# Patient Record
Sex: FEMALE | Race: WHITE | NOT HISPANIC OR LATINO | Employment: FULL TIME | URBAN - METROPOLITAN AREA
[De-identification: names, ages, dates, MRNs, and addresses within clinical notes are randomized per-mention and may not be internally consistent; named-entity substitution may affect disease eponyms.]

---

## 2018-10-02 ENCOUNTER — TRANSFERRED RECORDS (OUTPATIENT)
Dept: HEALTH INFORMATION MANAGEMENT | Facility: CLINIC | Age: 53
End: 2018-10-02

## 2018-10-02 LAB
CREAT SERPL-MCNC: 0.7 MG/DL (ref 0.5–1.2)
GFR SERPL CREATININE-BSD FRML MDRD: 100 ML/MIN/1.73M2
GLUCOSE SERPL-MCNC: 86 MG/DL (ref 65–99)
POTASSIUM SERPL-SCNC: 4.6 MMOL/L (ref 3.5–5)

## 2018-10-03 ENCOUNTER — OFFICE VISIT (OUTPATIENT)
Dept: SURGERY | Facility: CLINIC | Age: 53
End: 2018-10-03
Payer: COMMERCIAL

## 2018-10-03 ENCOUNTER — TELEPHONE (OUTPATIENT)
Dept: OTHER | Facility: CLINIC | Age: 53
End: 2018-10-03

## 2018-10-03 VITALS
RESPIRATION RATE: 12 BRPM | WEIGHT: 293 LBS | SYSTOLIC BLOOD PRESSURE: 124 MMHG | OXYGEN SATURATION: 96 % | HEART RATE: 76 BPM | HEIGHT: 69 IN | BODY MASS INDEX: 43.4 KG/M2 | DIASTOLIC BLOOD PRESSURE: 88 MMHG

## 2018-10-03 VITALS — WEIGHT: 293 LBS | HEIGHT: 69 IN | BODY MASS INDEX: 43.4 KG/M2

## 2018-10-03 DIAGNOSIS — Z13.0 SCREENING FOR ENDOCRINE, NUTRITIONAL, METABOLIC AND IMMUNITY DISORDER: ICD-10-CM

## 2018-10-03 DIAGNOSIS — Z86.39 HISTORY OF HIGH CHOLESTEROL: ICD-10-CM

## 2018-10-03 DIAGNOSIS — K21.9 GERD WITHOUT ESOPHAGITIS: ICD-10-CM

## 2018-10-03 DIAGNOSIS — Z15.89 MTHFR GENE MUTATION: ICD-10-CM

## 2018-10-03 DIAGNOSIS — G43.909 MIGRAINE WITHOUT STATUS MIGRAINOSUS, NOT INTRACTABLE, UNSPECIFIED MIGRAINE TYPE: ICD-10-CM

## 2018-10-03 DIAGNOSIS — E66.01 MORBID OBESITY WITH BMI OF 45.0-49.9, ADULT (H): ICD-10-CM

## 2018-10-03 DIAGNOSIS — Z13.21 SCREENING FOR ENDOCRINE, NUTRITIONAL, METABOLIC AND IMMUNITY DISORDER: ICD-10-CM

## 2018-10-03 DIAGNOSIS — Z13.29 SCREENING FOR ENDOCRINE, NUTRITIONAL, METABOLIC AND IMMUNITY DISORDER: ICD-10-CM

## 2018-10-03 DIAGNOSIS — E66.01 MORBID OBESITY WITH BMI OF 45.0-49.9, ADULT (H): Primary | ICD-10-CM

## 2018-10-03 DIAGNOSIS — Z13.228 SCREENING FOR ENDOCRINE, NUTRITIONAL, METABOLIC AND IMMUNITY DISORDER: ICD-10-CM

## 2018-10-03 PROCEDURE — 99205 OFFICE O/P NEW HI 60 MIN: CPT | Performed by: PHYSICIAN ASSISTANT

## 2018-10-03 PROCEDURE — 97802 MEDICAL NUTRITION INDIV IN: CPT | Performed by: DIETITIAN, REGISTERED

## 2018-10-03 RX ORDER — PRAVASTATIN SODIUM 20 MG
20 TABLET ORAL EVERY EVENING
COMMUNITY

## 2018-10-03 NOTE — TELEPHONE ENCOUNTER
Pt referred to [facility] by Eleonora London PA-C for   Pre/Post op plan for bariatric surgery and MTHFR gene mutation.    Pt needs to be scheduled for consult with vascular medicine.  Will route to scheduling to coordinate an appointment at next available.    BENJY QueenN, RN

## 2018-10-03 NOTE — PROGRESS NOTES
"New Bariatric Nutrition Consultation Note    Reason For Visit: Nutrition Assessment    Laura Nava is a 52 year old presenting today for new bariatric nutrition consult.  Pt is interested in laparoscopic sleeve gastrectomy.  Patient is accompanied by self.    Support System Reviewed With Patient 10/2/2018   Who do you have in your support network that can be available to help you for the first 2 weeks after surgery? spouse, kids   Who can you count on for support throughout your weight loss surgery journey? sister       ANTHROPOMETRICS:  Estimated body mass index is 48.04 kg/(m^2) as calculated from the following:    Height as of this encounter: 1.753 m (5' 9\").    Weight as of this encounter: 147.6 kg (325 lb 4.8 oz).    Required weight loss goal pre-op: 5 lbs from initial consult weight (goal weight 320.3 lbs or less before surgery)       10/2/2018   I have tried the following methods to lose weight Watching portions or calories, Exercise, Weight Watchers, Pre packaged meals ex: Nutrisystem, Slimfast, OTC Medications, Prescription Medications       Weight Loss Questions Reviewed With Patient 10/2/2018   How long have you been overweight? Since late 20's to early 40's       SUPPLEMENT INFORMATION:  Vitamin D - 2000 international units    NUTRITION HISTORY:  Recall Diet Questions Reviewed With Patient 10/2/2018   Describe what you typically consume for breakfast (typical or most recent): peanut butter toast, juice, coffee   Describe what you typically consume for lunch (typical or most recent): sandwich, fruit, yogurt   Describe what you typically consume for supper (typical or most recent): chicken, potatoes, veg, fruit, bread, milk   Describe what you typically consume as snacks (typical or most recent): M&Ms, granola bar   How many ounces of water, or other low calorie drinks, do you drink daily (8 oz=1 glass)? 48 oz   How many ounces of caffeine (coffee, tea, pop) do you drink daily (8 oz=1 glass)? 16 oz "   How many ounces of carbonated (pop, beer, sparkling water) drinks do you drinky daily (8 oz=1 glass)? 8 oz   How many ounces of juice, pop, sweet tea, sports drinks, protein drinks, other sweetened drinks, do you drink daily (8 oz=1 glass)? 8 oz   How many ounces of milk do you drink daily (8 oz=1 glass) 8 oz   Please indicate the type of milk: 1%   How often do you drink alcohol? Monthly or less   If you do drink alcohol, how many drinks might you have in a day? (one drink = 5 oz. wine, 1 can/bottle of beer, 1 shot liquor) 1 or 2       Eating Habits 10/2/2018   Do you have any dietary restrictions? No   Do you currently binge eat (eat a large amount of food in a short time)? No   Are you an emotional eater? No   Do you get up to eat after falling asleep? No   What foods do you crave? sweets       ADDITIONAL INFORMATION:  Pt has been thinking about surgery for years. Has one sister who had bariatric surgery 10 years ago with poor long-term success. Has another sister going through pre-op bariatric process currently. Pt is a  and had many appropriate questions today.     Dining Out History Reviewed With Patient 10/2/2018   How often do you dine out? Around once a week.   Where do you dine out? (select all that apply) fast food chains   What types of food do you order when you dine out? hamburger, fries, diet pop       Physical Activity Reviewed With Patient 10/2/2018   How often do you exercise? 3 to 4 times per week   What is the duration of your exercise (in minutes)? 15 Minutes   What types of exercise do you do? walking   What keeps you from being more active?  Lack of Time       NUTRITION DIAGNOSIS:  Obesity r/t long history of self-monitoring deficit and excessive energy intake aeb BMI >30 kg/m2.    INTERVENTION:  Intervention Provided/Education Provided on post-op diet guidelines, vitamins/minerals essential post-operatively, GI anatomy of bariatric surgeries, ways to help prepare for post-op  diet guidelines pre-operatively, portion/calorie-control, mindful eating.  Provided pt with list of goals RD contact information.      Questions Reviewed With Patient 10/2/2018   How ready are you to make changes regarding your weight? Number 1 = Not ready at all to make changes up to 10 = very ready. 10   How confident are you that you can change? 1 = Not confident that you will be successful making changes up to 10 = very confident. 10       Patient Understanding: good  Expected Compliance: good    GOALS:  Begin taking multivitamin and calcium per guidelines  Exercise for 30 minutes, 4x/week  Reduce caffeine and carbonation by 50%    Follow-Up:   Recommend 2-3 follow up visits to assist with lifestyle changes or per insurance.      Time spent with patient: 60 minutes.    Xiomy Alva RD, LD  Clinical Dietitian

## 2018-10-03 NOTE — MR AVS SNAPSHOT
MRN:[id number]                      After Visit Summary   10/3/2018    Laura Nava    MRN: [id number]           Visit Information        Provider Department      10/3/2018 3:00 PM 3, Phoebe Ayala RD [facility]      Your next 10 appointments already scheduled     Nov 02, 2018  9:30 AM CDT   Return Bariatric Nutrition Visit with Phoebe Ayala 3, RD   [facility] ([facility])    [address] [place] [id number] [phone]              MyChart Information     [employer] gives you secure access to your electronic health record. If you see a primary care provider, you can also send messages to your care team and make appointments. If you have questions, please call your primary care clinic.  If you do not have a primary care provider, please call [phone] and they will assist you.        Care EveryWhere ID     This is your Care EveryWhere ID. This could be used by other organizations to access your [facility] medical records  [id number]        Equal Access to Services     [place] BACK : Hadii arlin Diaz, waaxda luqadaha, qaybta kaalmachester briones, faith monroe. So [facility] [phone].    ATENCIÓN: Si habla español, tiene a diaz disposición servicios gratuitos de asistencia lingüística. Llame al [phone].    We comply with applicable federal civil rights laws and [place] laws. We do not discriminate on the basis of race, color, national origin, age, disability, sex, sexual orientation, or gender identity.

## 2018-10-03 NOTE — LETTER
Laura Nava  October 3, 2018        Bariatric Task List      Lose 5 lbs prior to surgery.  Goal Weight: 320.3 lbs  Have preoperative laboratory tests drawn. - order A1C and CMP. Pt to get remaining labs faxed to clinic  Psychological Evaluation with MMPI and clearance for weight loss surgery.- [place]  Achieve clearance from dietitian to see surgeon.  Pre/Post op anticoagulation plan

## 2018-10-03 NOTE — PROGRESS NOTES
"    New Bariatric Surgery Consultation Note    RE: Laura Nava  MR#: [id number]  : 1965      Referring provider: Vicki Ruiz PA-C at [facility]    Chief Complaint/Reason for visit: evaluation for possible weight loss surgery    Dear Vicki Ruiz MD (General),    I had the pleasure of seeing your patient, Laura Nava, to evaluate her obesity and consider her for possible weight loss surgery. As you know, Laura Nava is 52 year old.  She has a height of 5' 9\", a weight of 325 lbs 4.8 oz, and calculated Body mass index is 48.04 kg/(m^2).        HISTORY OF PRESENT ILLNESS:  Weight Loss History Reviewed with Patient 10/2/2018   How long have you been overweight? Since late 20's to early 40's   What is the most that you have ever weighed? 320   What is the most weight you have lost? 30   I have tried the following methods to lose weight Watching portions or calories, Exercise, Weight Watchers, Pre packaged meals ex: Nutrisystem, Slimfast, OTC Medications, Prescription Medications   I have tried the following weight loss medications? (Check all that apply) Phentermine/Adipex-p/Suprenza   Have you ever had weight loss surgery? No     Lost 30 pds on weight watchers. No history fen-phen use.  Used phentermine about 6-7 years ago.  Nothing since    CO-MORBIDITIES OF OBESITY INCLUDE:     10/2/2018   I have the following co-morbidities associated with obesity: High Cholesterol, Weight Bearing Joint Pain     On meds for cholesterol.     PAST MEDICAL HISTORY:  Past Medical History:   Diagnosis Date     Anxiety     zoloft     Migraines Approx     Under Dr. Reyes neurologist care     Takes nortryptyline daily for migraine management.  Zomig as needed.  Much improved over the past year since starting medication    PAST SURGICAL HISTORY:  Past Surgical History:   Procedure Laterality Date     ENT SURGERY - Ear surgery       With 3rd and last pregnancy patient developed blood clots in " her placenta that lead to the diagnosis of MTHFR.  She took lovenox for the remainder of her pregnancy.  Mother and father both tested positive for this as well.     No family history of blood clots or anesthesia concerns      FAMILY HISTORY:   Family History   Problem Relation Age of Onset     Diabetes Paternal Grandmother      Coronary Artery Disease Father      Hyperlipidemia Father      Anxiety Disorder Father      Asthma Father      Obesity Father      Breast Cancer Mother      Other Cancer Brother      melanoma, brain tumor     Anxiety Disorder Brother      Anxiety Disorder Sister      Obesity Sister      Anxiety Disorder Sister      Thyroid Disease Sister      Obesity Sister      Uterine Cancer Maternal Grandmother      Sister had gastric bypass about 10 years ago.    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 , professor, manager   Which best describes your marital status:    Do you have children? Yes   Who do you have in your support network that can be available to help you for the first 2 weeks after surgery? spouse, kids   Who can you count on for support throughout your weight loss surgery journey? sister   Can you afford 3 meals a day?  Yes   Can you afford 50-60 dollars a month for vitamins? Yes       HABITS:     10/2/2018   How often do you drink alcohol? Monthly or less   If you do drink alcohol, how many drinks might you have in a day? (one drink = 5 oz. wine, 1 can/bottle of beer, 1 shot liquor) 1 or 2   Do you currently use any of the following Nicotine products? No   Have you ever used any of the following nicotine products? No   Have you or are you currently using street drugs or prescription strength medication for which you do not have a prescription for? No   Do you have a history of chemical  dependency (alcohol or drug abuse)? No     Does not drink much alcohol    PSYCHOLOGICAL HISTORY:   Psychological History Reviewed With Patient 10/2/2018   Have you ever attempted suicide? Never.   Have you had thoughts of suicide in the past year? No   Have you ever been hospitalized for mental illness or a suicide attempt? Never.   Do you have a history of chronic pain? No   Have you ever been diagnosed with fibromyalgia? No   Are you currently being treated for any of the following? (select all that apply) Anxiety   Are you currently seeing a therapist or counselor?  No   Are you currently seeing a psychiatrist? No     Anxiety is managed very well with medication that she has been on for over 20+ years    ROS:     10/2/2018   Skin:  None of the above   HEENT: Headaches   Musculoskeletal: Joint Pain   Cardiovascular: None of the above   Pulmonary: Experience morning headaches   Gastrointestinal: None of the above   Genitourinary: None of the above   Hematological: Clotting problems   Neurological: Migraine headaches   Female only: Birth control     Has been taking otc prilosec for about a week due to heartburn.  Has been helping.  Has a 24 day course.  Had sleep study 4 years ago.  Weight similar to today and negative for IRINA.  No snoring or witnessed apneic spells    EATING BEHAVIORS:     10/2/2018   Have you or anyone else thought that you had an eating disorder? No   Do you currently binge eat (eat a large amount of food in a short time)? No   Are you an emotional eater? No   Do you get up to eat after falling asleep? No       EXERCISE:     10/2/2018   How often do you exercise? 3 to 4 times per week   What is the duration of your exercise (in minutes)? 15 Minutes   What types of exercise do you do? walking   What keeps you from being more active?  Lack of Time       MEDICATIONS:  Current Outpatient Prescriptions   Medication     Cholecalciferol (VITAMIN D-3 PO)     NORTRIPTYLINE HCL PO     pravastatin  "(PRAVACHOL) 40 MG tablet     Sertraline HCl (ZOLOFT PO)     ZOLMitriptan (ZOMIG PO)     No current facility-administered medications for this visit.        ALLERGIES:  Allergies   Allergen Reactions     Avelox [Moxifloxacin]      Loss of taste     Cefzil [Cefprozil] Nausea and Vomiting     Doxycycline Hives       LABS/IMAGING/MEDICAL RECORDS REVIEW: Patient had vitamin D and CBC drawn in the past week at her primary care office.  Not in Care Everywhere.  She will get copies to this clinic    PHYSICAL EXAM:  /88 (BP Location: Right arm, Patient Position: Sitting, Cuff Size: Adult Large)  Pulse 76  Resp 12  Ht 5' 9\" (1.753 m)  Wt 325 lb 4.8 oz (147.6 kg)  SpO2 96%  BMI 48.04 kg/m2     GENERAL:  Good development and normal affect in no acute distress. Patient is alert and orientated x 3 and answers all questions appropriately.  HEENT: Head is normocephalic, nontraumatic. Pupils equal and round without conjunctival injection. Neck is supple without lymphadenopathy, thyroidmegaly, or mass. Trachea midline. Dentition WNL. No missing teeth  CARDIOVASCULAR:  Regular rate and rhythm without murmurs, rubs, or gallops.  RESPIRATORY: Lungs are clear to auscultation bilaterally with good breath sounds.  GASTROINTESTINAL: Abdomen is obese, nondistended, soft, nontender, without organomegaly or masses. No bruits.  LOWER EXTREMITIES: No LE edema bilaterally, no cyanosis, ulceration, or chronic venous stasis noted.  MUSCULOSKELETAL:  Moves all 4 extremities equal and strong. Has a normal gait.   NEUROLOGIC: Cranial nerves II-XII grossly intact.  SKIN: No intertriginous irritation or rash.       In summary, Laura Nava has  obesity  Body mass index is 48.04 kg/(m^2).  and the comorbidities stated above. She completed an informational seminar and is a candidate for the laparoscopic gastric sleeve.  She will have to complete the following pre-requisites:    Lose 5 lbs prior to surgery.  Goal Weight: 320.3 lbs  Have " preoperative laboratory tests drawn. - order A1C and CMP. Pt to get remaining labs faxed to clinic  Psychological Evaluation with MMPI and clearance for weight loss surgery.- [place]  Achieve clearance from dietitian to see surgeon.  Pre/Post op anticoagulation plan    Today in the office we discussed gastric sleeve surgery. Preoperative, perioperative, and postoperative processes, management, and follow up were addressed.  Risks and benefits were outlined including the risk of death, staple line leak (1-2%), PE, DVT, ulcer, worsening GERD, N/V, stricture, hernia, wound infection, weight regain, and vitamin deficiencies. I emphasized exercise and activity along with appropriate food choice as the main foundation for weight loss with surgery providing surgical reinforcement of this.  All questions were answered.  A goal sheet and support group handout were given to the patient.    Once the patient has completed the requirements in their task list and there are no further recommendations, the pt will be allowed to see the surgeon of her choice for consultation on the laparoscopic gastric sleeve surgery. Patient verbalizes understanding of the process to surgery and expectations for the postoperative period including the need for lifelong lifestyle changes, vitamin supplementation, and laboratory monitoring.     Sincerely,    Eleonora London PA-C    I spent a total of 57 minutes face to face with Laura during today's office visit. Over 50% of this time was spent counseling the patient and/or coordinating care.

## 2018-10-03 NOTE — MR AVS SNAPSHOT
After Visit Summary   10/3/2018    Laura Nava    MRN: [id number]           Patient Information     Date Of Birth          1965        Visit Information        Provider Department      10/3/2018 2:00 PM Eleonora London PA-C [facility]      Today's Diagnoses     Morbid obesity with BMI of 45.0-49.9, adult (H)    -  1    History of high cholesterol        Migraine without status migrainosus, not intractable, unspecified migraine type        GERD without esophagitis        Screening for endocrine, nutritional, metabolic and immunity disorder        MTHFR gene mutation (H)           Follow-ups after your visit        Additional Services     NUTRITION REFERRAL       Type of nutrition instruction needed: Weight Loss  Your provider has referred you to:     [facility]            VASCULAR SURGERY REFERRAL       Your provider has referred you to: **Vascular  Services [phone] - Pre/Post op plan for bariatric surgery & None - Please Order Appropriate Testing   [email or web address]    Please be aware that coverage of these services is subject to the terms and limitations of your health insurance plan.  Call member services at your health plan with any benefit or coverage questions.      Please bring the following with you to your appointment:    (1) Any X-Rays, CTs or MRIs which have been performed.  Contact the facility where they were done to arrange for  prior to your scheduled appointment.    (2) List of current medications   (3) This referral request   (4) Any documents/labs given to you for this referral                  Future tests that were ordered for you today     Open Future Orders        Priority Expected Expires Ordered    Hemoglobin A1c Routine 10/3/2018 4/1/2019 10/3/2018    Comprehensive metabolic panel Routine 10/3/2018  "4/1/2019 10/3/2018            Who to contact     If you have questions or need follow up information about today's clinic visit or your schedule please contact [facility] directly at [phone].  Normal or non-critical lab and imaging results will be communicated to you by [employer], letter or phone within 4 business days after the clinic has received the results. If you do not hear from us within 7 days, please contact the clinic through [employer] or phone. If you have a critical or abnormal lab result, we will notify you by phone as soon as possible.  Submit refill requests through [employer] or call your pharmacy and they will forward the refill request to us. Please allow 3 business days for your refill to be completed.          Additional Information About Your Visit        [employer] Information     [employer] gives you secure access to your electronic health record. If you see a primary care provider, you can also send messages to your care team and make appointments. If you have questions, please call your primary care clinic.  If you do not have a primary care provider, please call [phone] and they will assist you.        Care EveryWhere ID     This is your Care EveryWhere ID. This could be used by other organizations to access your [facility] medical records  [id number]        Your Vitals Were     Pulse Respirations Height Pulse Oximetry BMI (Body Mass Index)       76 12 5' 9\" (1.753 m) 96% 48.04 kg/m2        Blood Pressure from Last 3 Encounters:   10/03/18 124/88    Weight from Last 3 Encounters:   10/03/18 325 lb 4.8 oz (147.6 kg)              We Performed the Following     NUTRITION REFERRAL     VASCULAR SURGERY REFERRAL        Primary Care Provider Office Phone # Fax #    Vicki Ruiz [phone]       [address]   [place] [facility] [address]        Equal Access to Services     [place] BACK AH: hakeem Mitchell " rubina garciamachester degrootfaith cespedes mickeyin hayaan adeeg kharash la'aan ah. So [facility] [phone].    ATENCIÓN: Si kinga rubio, tiene a diaz disposición servicios gratuitos de asistencia lingüística. Zulyame al [phone].    We comply with applicable federal civil rights laws and [place] laws. We do not discriminate on the basis of race, color, national origin, age, disability, sex, sexual orientation, or gender identity.            Thank you!     Thank you for choosing [facility]  for your care. Our goal is always to provide you with excellent care. Hearing back from our patients is one way we can continue to improve our services. Please take a few minutes to complete the written survey that you may receive in the mail after your visit with us. Thank you!             Your Updated Medication List - Protect others around you: Learn how to safely use, store and throw away your medicines at www.disposemymeds.org.          This list is accurate as of 10/3/18  3:52 PM.  Always use your most recent med list.                   Brand Name Dispense Instructions for use Diagnosis    NORTRIPTYLINE HCL PO           pravastatin 40 MG tablet    PRAVACHOL     Take 40 mg by mouth daily        PRILOSEC PO      Take 20 mg by mouth daily        VITAMIN D-3 PO           ZOLOFT PO      Take 50 mg by mouth daily        ZOMIG PO

## 2018-10-03 NOTE — LETTER
[facility]          [address]  [address]  [place] [address]                                         Tel:  [phone]  Fax: [phone]    2019    Laura Nava  [address]  [place] [address]    : 1965      Dear Laura;     We have identified that you have outstanding lab tests that have . If you would like to have the  labs completed, please contact our clinic at [phone] to have them re-ordered. If you have any questions, please contact our office.  Sincerely,    Catina Velazquez CMA

## 2018-10-04 NOTE — TELEPHONE ENCOUNTER
Left voicemail for pt to call us back on 10/4/18 Eva Brunner -  at [facility]

## 2018-10-08 ENCOUNTER — HEALTH MAINTENANCE LETTER (OUTPATIENT)
Age: 53
End: 2018-10-08

## 2018-10-08 NOTE — TELEPHONE ENCOUNTER
Left message on home number for patient to call back to schedule consult appointment with vascular medicine.    Azra Philip MA

## 2018-10-17 ENCOUNTER — TRANSFERRED RECORDS (OUTPATIENT)
Dept: HEALTH INFORMATION MANAGEMENT | Facility: CLINIC | Age: 53
End: 2018-10-17

## 2018-10-17 ENCOUNTER — OFFICE VISIT (OUTPATIENT)
Dept: OTHER | Facility: CLINIC | Age: 53
End: 2018-10-17
Attending: INTERNAL MEDICINE
Payer: COMMERCIAL

## 2018-10-17 VITALS
DIASTOLIC BLOOD PRESSURE: 75 MMHG | OXYGEN SATURATION: 99 % | HEART RATE: 86 BPM | BODY MASS INDEX: 48.17 KG/M2 | WEIGHT: 293 LBS | SYSTOLIC BLOOD PRESSURE: 132 MMHG

## 2018-10-17 DIAGNOSIS — E66.01 MORBID OBESITY WITH BMI OF 45.0-49.9, ADULT (H): ICD-10-CM

## 2018-10-17 DIAGNOSIS — Z86.718 PERSONAL HISTORY OF DVT (DEEP VEIN THROMBOSIS): ICD-10-CM

## 2018-10-17 DIAGNOSIS — E66.01 MORBID OBESITY WITH BMI OF 45.0-49.9, ADULT (H): Primary | ICD-10-CM

## 2018-10-17 LAB
BASOPHILS # BLD AUTO: 0 10E9/L (ref 0–0.2)
BASOPHILS NFR BLD AUTO: 0.3 %
DIFFERENTIAL METHOD BLD: NORMAL
EOSINOPHIL # BLD AUTO: 0.1 10E9/L (ref 0–0.7)
EOSINOPHIL NFR BLD AUTO: 2.2 %
ERYTHROCYTE [DISTWIDTH] IN BLOOD BY AUTOMATED COUNT: 13.7 % (ref 10–15)
HCT VFR BLD AUTO: 42.8 % (ref 35–47)
HGB BLD-MCNC: 14 G/DL (ref 11.7–15.7)
LYMPHOCYTES # BLD AUTO: 1.6 10E9/L (ref 0.8–5.3)
LYMPHOCYTES NFR BLD AUTO: 27.6 %
MCH RBC QN AUTO: 29.7 PG (ref 26.5–33)
MCHC RBC AUTO-ENTMCNC: 32.7 G/DL (ref 31.5–36.5)
MCV RBC AUTO: 91 FL (ref 78–100)
MONOCYTES # BLD AUTO: 0.5 10E9/L (ref 0–1.3)
MONOCYTES NFR BLD AUTO: 8.7 %
NEUTROPHILS # BLD AUTO: 3.6 10E9/L (ref 1.6–8.3)
NEUTROPHILS NFR BLD AUTO: 61.2 %
PLATELET # BLD AUTO: 293 10E9/L (ref 150–450)
RBC # BLD AUTO: 4.72 10E12/L (ref 3.8–5.2)
VIT B12 SERPL-MCNC: 183 PG/ML (ref 193–986)
WBC # BLD AUTO: 5.9 10E9/L (ref 4–11)

## 2018-10-17 PROCEDURE — G0463 HOSPITAL OUTPT CLINIC VISIT: HCPCS

## 2018-10-17 PROCEDURE — 36415 COLL VENOUS BLD VENIPUNCTURE: CPT | Performed by: INTERNAL MEDICINE

## 2018-10-17 PROCEDURE — 00000401 ZZHCL STATISTIC THROMBIN TIME NC: Performed by: INTERNAL MEDICINE

## 2018-10-17 PROCEDURE — 85730 THROMBOPLASTIN TIME PARTIAL: CPT | Performed by: INTERNAL MEDICINE

## 2018-10-17 PROCEDURE — 85732 THROMBOPLASTIN TIME PARTIAL: CPT | Performed by: INTERNAL MEDICINE

## 2018-10-17 PROCEDURE — 80053 COMPREHEN METABOLIC PANEL: CPT | Performed by: INTERNAL MEDICINE

## 2018-10-17 PROCEDURE — 99204 OFFICE O/P NEW MOD 45 MIN: CPT | Mod: ZP | Performed by: INTERNAL MEDICINE

## 2018-10-17 PROCEDURE — 85613 RUSSELL VIPER VENOM DILUTED: CPT | Performed by: INTERNAL MEDICINE

## 2018-10-17 PROCEDURE — 85597 PHOSPHOLIPID PLTLT NEUTRALIZ: CPT | Performed by: INTERNAL MEDICINE

## 2018-10-17 PROCEDURE — 83090 ASSAY OF HOMOCYSTEINE: CPT | Performed by: INTERNAL MEDICINE

## 2018-10-17 PROCEDURE — 81241 F5 GENE: CPT | Performed by: INTERNAL MEDICINE

## 2018-10-17 PROCEDURE — 00000167 ZZHCL STATISTIC INR NC: Performed by: INTERNAL MEDICINE

## 2018-10-17 PROCEDURE — 82607 VITAMIN B-12: CPT | Performed by: INTERNAL MEDICINE

## 2018-10-17 PROCEDURE — 85025 COMPLETE CBC W/AUTO DIFF WBC: CPT | Performed by: INTERNAL MEDICINE

## 2018-10-17 NOTE — MR AVS SNAPSHOT
After Visit Summary   10/17/2018    Laura Nava    MRN: [id number]           Patient Information     Date Of Birth          1965        Visit Information        Provider Department      10/17/2018 10:20 AM Shaun Zuniga MD [facility]      Today's Diagnoses     Morbid obesity with BMI of 45.0-49.9, adult (H)    -  1    Personal history of DVT (deep vein thrombosis)           Follow-ups after your visit        Your next 10 appointments already scheduled     Nov 02, 2018  9:30 AM CDT   Return Bariatric Nutrition Visit with [facility] 3, RD   [facility] [place] ([facility])    [address] [place] [id number] [phone]              Future tests that were ordered for you today     Open Future Orders        Priority Expected Expires Ordered    CBC with platelets differential Routine 10/17/2018 10/17/2019 10/17/2018    Comprehensive metabolic panel Routine 10/17/2018 10/17/2019 10/17/2018    Factor 5 leiden mutation analysis Routine 10/17/2018 12/17/2018 10/17/2018    Lupus Anticoagulant Panel Routine 10/17/2018 10/17/2019 10/17/2018            Who to contact     If you have questions or need follow up information about today's clinic visit or your schedule please contact [facility] directly at [phone].  Normal or non-critical lab and imaging results will be communicated to you by MyChart, letter or phone within 4 business days after the clinic has received the results. If you do not hear from us within 7 days, please contact the clinic through MyChart or phone. If you have a critical or abnormal lab result, we will notify you by phone as soon as possible.  Submit refill requests through [employer] or call your pharmacy and they will forward the refill request to us. Please allow 3 business days for your refill to be  completed.          Additional Information About Your Visit        [employer] Information     [employer] gives you secure access to your electronic health record. If you see a primary care provider, you can also send messages to your care team and make appointments. If you have questions, please call your primary care clinic.  If you do not have a primary care provider, please call [phone] and they will assist you.        Care EveryWhere ID     This is your Care EveryWhere ID. This could be used by other organizations to access your [facility] medical records  [id number]        Your Vitals Were     Pulse Pulse Oximetry Breastfeeding? BMI (Body Mass Index)          86 99% No 48.17 kg/m2         Blood Pressure from Last 3 Encounters:   10/17/18 132/75   10/03/18 124/88    Weight from Last 3 Encounters:   10/17/18 326 lb 3.2 oz (148 kg)   10/03/18 325 lb 4.8 oz (147.6 kg)   10/03/18 325 lb 4.8 oz (147.6 kg)              We Performed the Following     Homocysteine     Vitamin B12        Primary Care Provider Office Phone # Fax #    Vicki Ruiz [phone]       [address] ALVARADO [address] [place], [place] [id number]        Equal Access to Services     [place] BACK AH: Hadii arlin serna hadasho Soomaali, waaxda luqadaha, qaybta kaalmada adeegyada, faith monroe. So [facility] [phone].    ATENCIÓN: Si habla español, tiene a diaz disposición servicios gratuitos de asistencia lingüística. Llame al [phone].    We comply with applicable federal civil rights laws and [place] laws. We do not discriminate on the basis of race, color, national origin, age, disability, sex, sexual orientation, or gender identity.            Thank you!     Thank you for choosing [facility]  for your care. Our goal is always to provide you with excellent care. Hearing back from our patients is one way we can continue to improve our services. Please take a few minutes to  complete the written survey that you may receive in the mail after your visit with us. Thank you!             Your Updated Medication List - Protect others around you: Learn how to safely use, store and throw away your medicines at www.disposemymeds.org.          This list is accurate as of 10/17/18 11:31 AM.  Always use your most recent med list.                   Brand Name Dispense Instructions for use Diagnosis    NORTRIPTYLINE HCL PO           pravastatin 40 MG tablet    PRAVACHOL     Take 40 mg by mouth daily        PRILOSEC PO      Take 20 mg by mouth daily        VITAMIN D-3 PO           ZOLOFT PO      Take 50 mg by mouth daily        ZOMIG PO

## 2018-10-17 NOTE — NURSING NOTE
"Laura Nava is a 53 year old female who presents for:  Chief Complaint   Patient presents with     Consult     New consult for family history of blood clotting disorder prior to bariatric surgery, ref by Eleonora London, records in Epic        Vitals:    Vitals:    10/17/18 1022 10/17/18 1023   BP: 133/86 132/75   BP Location: Right arm Left arm   Patient Position: Chair Chair   Cuff Size: Adult Large Adult Large   Pulse: 71 86   SpO2: 99%    Weight: 326 lb 3.2 oz (148 kg)        BMI:  Estimated body mass index is 48.17 kg/(m^2) as calculated from the following:    Height as of 10/3/18: 5' 9\" (1.753 m).    Weight as of this encounter: 326 lb 3.2 oz (148 kg).    Pain Score:  Data Unavailable        Rebecca mSith MA      "

## 2018-10-17 NOTE — PROGRESS NOTES
Vascular Medicine Consultation     Chief Complaint   Perioperative evaluation for VT E anticoagulation      Date of Admission:  (Not on file)    Laura Nava is a 53 year old female for perioperative evaluation.    Code Status    Full code PCP    Reason for Consult   Reason for consult: I was asked by PCP to evaluate this patient for perioperative evaluation.    Primary Care Physician   Vicki Ruiz      History is obtained from the patient    History of Present Illness   Laura Nava is a 53 year old female who presents with perioperative evaluation for prophylactic VT E anticoagulation treatment prior and after bariatric surgery that scheduled to take place in 2 months from now  Patient has no family or personal history of blood coagulation yet she mentioned she had a low birth baby who birth weight baby with possible preeclampsia and she was given Lovenox for 20 weeks  Patient denies any history of blood clotting, no history of hormonal treatment, no history of recent surgery, patient is morbidly obese her BMI is more than 40 Cabrini score indicated her risk factors of 7, she is a high risk, her risk score is more than 5, patient carries more than 6% incidence of VT E without prophylaxis  Patient echo cardiogram and it was a stress echo showed no evidence of reversible ischemia and her ejection fraction is 55% with normal valves and chambers and normal pressures patient has no evidence of sleep apnea she had a sleep study done and it was negative    Past Medical History   I have reviewed this patient's medical history and updated it with pertinent information if needed.   Past Medical History:   Diagnosis Date     Anxiety 1999    zoloft     Migraines Approx 2014    Under Dr. Reyes neurologist care       Past Surgical History   I have reviewed this patient's surgical history and updated it with pertinent information if needed.  Past Surgical History:   Procedure Laterality Date     ENT SURGERY          Prior to Admission Medications   Cannot display prior to admission medications because the patient has not been admitted in this contact.     Allergies   Allergies   Allergen Reactions     Avelox [Moxifloxacin]      Loss of taste     Cefzil [Cefprozil] Nausea and Vomiting     Doxycycline Hives       Social History   I have reviewed this patient's social history and updated it with pertinent information if needed. Laura Nava  reports that she has never smoked. She has never used smokeless tobacco. She reports that she does not drink alcohol or use illicit drugs.    Family History   I have reviewed this patient's family history and updated it with pertinent information if needed.   Family History   Problem Relation Age of Onset     Diabetes Paternal Grandmother      Coronary Artery Disease Father      Hyperlipidemia Father      Anxiety Disorder Father      Asthma Father      Obesity Father      Breast Cancer Mother      Other Cancer Brother      melanoma, brain tumor     Anxiety Disorder Brother      Anxiety Disorder Sister      Obesity Sister      Anxiety Disorder Sister      Thyroid Disease Sister      Obesity Sister      Uterine Cancer Maternal Grandmother        Review of Systems   The 10 point Review of Systems is negative other than noted in the HPI or here.     Physical Exam       BP: 132/75 Pulse: 86     SpO2: 99 %      Vital Signs with Ranges  Pulse:  [71-86] 86  BP: (132-133)/(75-86) 132/75  SpO2:  [99 %] 99 %  326 lbs 3.2 oz    Constitutional: awake, alert, cooperative, no apparent distress, and appears stated age  Eyes: Lids and lashes normal, pupils equal, round and reactive to light, extra ocular muscles intact, sclera clear, conjunctiva normal  ENT: normocepalic, without obvious abnormality, oropharynx pink and moist  Hematologic / Lymphatic: no lymphadenopathy  Respiratory: No increased work of breathing, good air exchange, clear to auscultation bilaterally, no crackles or  wheezing  Cardiovascular: regular rate and rhythm, normal S1 and S2 and no murmur noted  GI: Normal bowel sounds, soft, non-distended, non-tender  Skin: no redness, warmth, or swelling, no rashes and no lesions  Musculoskeletal: There is no redness, warmth, or swelling of the joints.  Full range of motion noted.  Motor strength is 5 out of 5 all extremities bilaterally.  Tone is normal.  Neurologic: Awake, alert, oriented to name, place and time.  Cranial nerves II-XII are grossly intact.  Motor is 5 out of 5 bilaterally.    Neuropsychiatric:  Normal affect, memory, insight.  Pulses: Intact pulses DP and PT pulses bilateral +2  . No carotid bruits appreciated.     Data   Most Recent 3 CBC's:No lab results found.  Most Recent 3 BMP's:No lab results found.  Most Recent 2 LFT's:No lab results found.  Most Recent 3 INR's:No lab results found.  Most Recent Cholesterol Panel:No lab results found.  Most Recent 6 Bacteria Isolates From Any Culture (See EPIC Reports for Culture Details):No lab results found.  Most Recent 6 glucoses:No lab results found.    Invalid input(s): BPM      Assessment & Plan   (E66.01,  Z68.42) Morbid obesity with BMI of 45.0-49.9, adult (H)  (primary encounter diagnosis)  Comment: Based on her Cabrini score she is a high risk for VT E, risk score is more than 5, 6% incidence of having VT E without prophylaxis,    Plan: CBC with platelets differential, Comprehensive         metabolic panel, Vitamin B12, Lupus         Anticoagulant Panel          Patient currently on Lovenox, 40 mg, subcu once daily, for 28 days or 10 mg of Xarelto p.o. daily with food for 28 days once it is permissible to be on anticoagulation after surgery is    (Z86.718) Personal history of DVT (deep vein thrombosis)  Comment: Testing the patient for hypercoagulation  Plan: Factor 5 leiden mutation analysis,         Homocysteine, Lupus Anticoagulant Panel              Summary: As a mentioned before based on her Cabrini score  patient should be on the following   1- Lovenox 40 mg subcu daily for 28 days  Or  2-Xarelto 10 mg p.o. daily with food for 28 days    Shaun Zuniga MD

## 2018-10-18 LAB
ALBUMIN SERPL-MCNC: 3.9 G/DL (ref 3.4–5)
ALP SERPL-CCNC: 90 U/L (ref 40–150)
ALT SERPL W P-5'-P-CCNC: 25 U/L (ref 0–50)
ANION GAP SERPL CALCULATED.3IONS-SCNC: 11 MMOL/L (ref 3–14)
AST SERPL W P-5'-P-CCNC: 24 U/L (ref 0–45)
BILIRUB SERPL-MCNC: 0.8 MG/DL (ref 0.2–1.3)
BUN SERPL-MCNC: 11 MG/DL (ref 7–30)
CALCIUM SERPL-MCNC: 8.8 MG/DL (ref 8.5–10.1)
CHLORIDE SERPL-SCNC: 105 MMOL/L (ref 94–109)
CO2 SERPL-SCNC: 23 MMOL/L (ref 20–32)
CREAT SERPL-MCNC: 0.76 MG/DL (ref 0.52–1.04)
GFR SERPL CREATININE-BSD FRML MDRD: 80 ML/MIN/1.7M2
GLUCOSE SERPL-MCNC: 79 MG/DL (ref 70–99)
POTASSIUM SERPL-SCNC: 3.9 MMOL/L (ref 3.4–5.3)
PROT SERPL-MCNC: 7.7 G/DL (ref 6.8–8.8)
SODIUM SERPL-SCNC: 139 MMOL/L (ref 133–144)

## 2018-10-19 LAB — LA PPP-IMP: NEGATIVE

## 2018-10-22 LAB — COPATH REPORT: NORMAL

## 2018-10-25 LAB — HCYS SERPL-SCNC: 8.3 UMOL/L (ref 4–12)

## 2018-11-02 ENCOUNTER — OFFICE VISIT (OUTPATIENT)
Dept: SURGERY | Facility: CLINIC | Age: 53
End: 2018-11-02
Payer: COMMERCIAL

## 2018-11-02 VITALS — BODY MASS INDEX: 43.4 KG/M2 | HEIGHT: 69 IN | WEIGHT: 293 LBS

## 2018-11-02 DIAGNOSIS — E66.01 MORBID OBESITY WITH BMI OF 45.0-49.9, ADULT (H): ICD-10-CM

## 2018-11-02 PROCEDURE — 97803 MED NUTRITION INDIV SUBSEQ: CPT | Performed by: DIETITIAN, REGISTERED

## 2018-11-02 NOTE — PROGRESS NOTES
"PRE SURGICAL WEIGHT LOSS NUTRITION APPOINTMENT    Laura Nava  1965  female  [id number]  53 year old    ASSESSMENT    Desired Surgical Procedure: gastric sleeve    REASON FOR VISIT:  Laura Nava is a 53 year old year old female presents today for a pre-surgical weight loss follow-up appointment. Patient accompanied by self.    DIAGNOSIS:  Weight Status Obesity Grade III BMI >40    ANTHROPOMETRICS:  Height: 175.3 cm (5' 9\")  Initial Weight: 325.3 lbs     Weight last visit: 325.3 lbs    Current weight: 145.7 kg (321 lb 1.6 oz)  BMI: 47.52 kg/(m^2).    VITAMINS AND MINERALS:  Daily Multivitamin with Minerals (Pony's Complete - AM)  1000 mg Calcium with Vitamin D once daily - mid morning  2000 International units Vitamin D    NUTRITION HISTORY:  Breakfast: (within 1h of waking) 2 slices of toast with peanut butter   Lunch: Lean Cuisine/frozen meals + fruit  Supper: spaghetti w/meat sauce + peas   Snacks: afternoon - sweets + evening - popcorn   Fluids Consumed: Water, juice, milk, eliminated pop, decaf coffee   Eating slower: Sometimes  Chewing foods thoroughly: Sometimes  Take 20-30 minutes to consume each meal: Sometimes  Fluids and meals separate by at least 30 minutes: Sometimes  Carbonation: none  Caffeine: none  Additional Information: Pt shares she has 6 months of structured weight loss required per insurance; has several previous visits with PCP and neurologist that may be eligible as diet visits; encouraged pt to have these visits sent for review.     PHYSICAL ACTIVITY:  Type: walking  Frequency: 4 (days per week)  Duration: 30 (minutes)     DIAGNOSIS:  Previous Nutrition Diagnosis: Obesity related to long history of self- monitoring deficit and excessive energy intake evidenced by BMI of 48.04 kg/m2  No change, modified below    Previous goals:   Begin taking multivitamin and calcium per guidelines - partially met  Exercise for 30 minutes, 4x/week - met  Reduce caffeine and carbonation by " 50% - met    Current Nutrition Diagnosis: Obesity related to long history of self-monitoring deficit and excessive energy intake as evidenced by BMI of 47.45 kg/m2.    INTERVENTION:  Nutrition Prescription: Recommended energy/nutrient modification.    GOALS:  Take calcium per guidelines (written and reviewed)  Continue to practice all pre-op guidelines  Have HgA1c done    Intervention:  - Discussed progress towards previous goals.  - Reinforced importance of making behavior changes in preparation for bariatric surgery.   - Assessed learning needs and learning preferences       NUTRITION MONITORING AND EVALUATION:  Anticipated compliance: fair-good  Patient demonstrated fair-good understanding.     Follow up: Continue to monitor patient closely regarding weight loss and diet.  # of visits needed: 1 (however may need more pending eligibility of previous PCP/neuro visits - see comments)    Cleared by RD: No     TIME SPENT WITH PATIENT: 25 minutes    Xiomy Alva RD, LD  Clinical Dietitian

## 2018-11-02 NOTE — MR AVS SNAPSHOT
MRN:[id number]                      After Visit Summary   11/2/2018    Laura Nava    MRN: [id number]           Visit Information        Provider Department      11/2/2018 9:30 AM [address] RD [facility]      Your next 10 appointments already scheduled     Dec 04, 2018  1:30 PM CST   (Arrive by 1:15 PM)   Return Bariatric Nutrition Visit with Phoebe Ayala [id number]   [facility] [place] ([facility])    [address] [place] [id number] [phone]              MyChart Information     [employer] gives you secure access to your electronic health record. If you see a primary care provider, you can also send messages to your care team and make appointments. If you have questions, please call your primary care clinic.  If you do not have a primary care provider, please call [phone] and they will assist you.        Care EveryWhere ID     This is your Care EveryWhere ID. This could be used by other organizations to access your [facility] medical records  [id number]        Equal Access to Services     [place] BACK : Hadii arlin Diaz, wahilda garcia, faith cutler. So [facility] [phone].    ATENCIÓN: Si habla español, tiene a diaz disposición servicios gratpatos de asistencia lingüística. [place] al [phone].    We comply with applicable federal civil rights laws and [place] laws. We do not discriminate on the basis of race, color, national origin, age, disability, sex, sexual orientation, or gender identity.

## 2018-11-06 ENCOUNTER — TRANSFERRED RECORDS (OUTPATIENT)
Dept: HEALTH INFORMATION MANAGEMENT | Facility: CLINIC | Age: 53
End: 2018-11-06

## 2018-11-09 DIAGNOSIS — Z13.21 SCREENING FOR ENDOCRINE, NUTRITIONAL, METABOLIC AND IMMUNITY DISORDER: Primary | ICD-10-CM

## 2018-11-09 DIAGNOSIS — Z13.29 SCREENING FOR ENDOCRINE, NUTRITIONAL, METABOLIC AND IMMUNITY DISORDER: Primary | ICD-10-CM

## 2018-11-09 DIAGNOSIS — E66.01 MORBID OBESITY WITH BMI OF 45.0-49.9, ADULT (H): ICD-10-CM

## 2018-11-09 DIAGNOSIS — Z13.228 SCREENING FOR ENDOCRINE, NUTRITIONAL, METABOLIC AND IMMUNITY DISORDER: Primary | ICD-10-CM

## 2018-11-09 DIAGNOSIS — Z13.0 SCREENING FOR ENDOCRINE, NUTRITIONAL, METABOLIC AND IMMUNITY DISORDER: Primary | ICD-10-CM

## 2018-12-04 ENCOUNTER — OFFICE VISIT (OUTPATIENT)
Dept: SURGERY | Facility: CLINIC | Age: 53
End: 2018-12-04
Payer: COMMERCIAL

## 2018-12-04 VITALS — HEIGHT: 69 IN | BODY MASS INDEX: 43.4 KG/M2 | WEIGHT: 293 LBS

## 2018-12-04 DIAGNOSIS — E66.01 MORBID OBESITY WITH BMI OF 45.0-49.9, ADULT (H): ICD-10-CM

## 2018-12-04 PROCEDURE — 97803 MED NUTRITION INDIV SUBSEQ: CPT | Performed by: DIETITIAN, REGISTERED

## 2018-12-04 NOTE — MR AVS SNAPSHOT
MRN:[id number]                      After Visit Summary   12/4/2018    Laura Nava    MRN: [id number]           Visit Information        Provider Department      12/4/2018 1:30 PM 3, Sh Marcela Ayala, FAVIOLA [facility] [place]      [facility] Information     [facility] gives you secure access to your electronic health record. If you see a primary care provider, you can also send messages to your care team and make appointments. If you have questions, please call your primary care clinic.  If you do not have a primary care provider, please call [phone] and they will assist you.        Care EveryWhere ID     This is your Care EveryWhere ID. This could be used by other organizations to access your [facility] medical records  [id number]        Equal Access to Services     [place] BACK : Randi Diaz, wahilda garcia, rubina kaalqasim briones, faith monroe. So [facility] [phone].    ATENCIÓN: Si habla español, tiene a diaz disposición servicios gratuitos de asistencia lingüística. Llame al [phone].    We comply with applicable federal civil rights laws and [place] laws. We do not discriminate on the basis of race, color, national origin, age, disability, sex, sexual orientation, or gender identity.

## 2018-12-18 ENCOUNTER — TELEPHONE (OUTPATIENT)
Dept: SURGERY | Facility: CLINIC | Age: 53
End: 2018-12-18

## 2018-12-18 NOTE — TELEPHONE ENCOUNTER
Called patient back. I answered her questions regarding insurance. I explained we do not call to verify insurance benefits. I let patient know that we have general knowledge of what insurance requires, but she would need to call to confirm with her insurance. Patient needs to one more dietician visit and we need a letter from PCP per [facility] requirements. Patient will have faxed to my attention. I can schedule surgeon consult once additional diet visit is reviewed and accepted. I will need PCP letter prior to submitting prior Auth. Patient understands and work on items needed.

## 2019-01-03 ENCOUNTER — OFFICE VISIT (OUTPATIENT)
Dept: SURGERY | Facility: CLINIC | Age: 54
End: 2019-01-03
Payer: COMMERCIAL

## 2019-01-03 VITALS
HEART RATE: 83 BPM | HEIGHT: 69 IN | DIASTOLIC BLOOD PRESSURE: 76 MMHG | SYSTOLIC BLOOD PRESSURE: 129 MMHG | OXYGEN SATURATION: 98 % | BODY MASS INDEX: 43.4 KG/M2 | WEIGHT: 293 LBS

## 2019-01-03 DIAGNOSIS — E66.01 MORBID OBESITY WITH BMI OF 45.0-49.9, ADULT (H): Primary | ICD-10-CM

## 2019-01-03 PROCEDURE — 99215 OFFICE O/P EST HI 40 MIN: CPT | Performed by: SURGERY

## 2019-01-03 ASSESSMENT — MIFFLIN-ST. JEOR: SCORE: 2127.7

## 2019-01-03 NOTE — PROGRESS NOTES
Dear Vicki Mann,      I was asked to see the patient regarding obesity by the referring provider above.    I had the pleasure of meeting with your patient Laura Nava in our weight loss surgery office.  This patient is a 53 year old female who has been undergoing our thorough preoperative screening process in anticipation of potential bariatric surgery.    At initial evaluation we recorded Laura Nava's   weight as 325 lbs and  BMI was 48.04.  The patient has been unsuccessful with other methods of permanent weight loss and suffers from multiple weight related medical conditions.  Due to lack of success in achieving weight loss through other methods, she is interested in undergoing bariatric surgery.    PREVIOUS WEIGHT LOSS ATTEMPTS:     10/2/2018   I have tried the following methods to lose weight Watching portions or calories, Exercise, Weight Watchers, Pre packaged meals ex: Nutrisystem, Slimfast, OTC Medications, Prescription Medications       CO-MORBIDITIES OF OBESITY INCLUDE:     10/2/2018   I have the following co-morbidities associated with obesity: High Cholesterol, Weight Bearing Joint Pain       VITALS:  There were no vitals taken for this visit.    PE:  GENERAL: Alert and oriented x3. NAD  HEENT exam: Sclerae not icteric. Hearing good. Head normocephalic and atraumatic.   CARDIOVASCULAR: No JVD  RESPIRATORY: Breathing unlabored  GASTROINTESTINAL: Obese  LOWER EXTREMITIES: no deformities  MUSCULOSKELETAL: Normal gait, Moves all 4 extremities equal and strong  NEUROLOGIC: no gross defect  SKIN: warm and dry to touch     In summary, she has undergone an appropriate medical evaluation, dietitian evaluation, as well as psychologic screening. The patient appears to be an appropriate candidate for bariatric surgery.    In the office today, I discussed the laparoscopic gastric sleeve surgery.  Risks, benefits and anticipated outcomes were outlined including the risk of death, staple line leak  (1-2%), PE, DVT, ulcer, worsening GERD, N/V, stricture, hernia, wound infection, weight regain, and vitamin deficiencies. This patient has a good chance of sustaining permanent weight loss due to this procedure.  This should also allow improvement if not resolution of his/her weight related medical conditions.    At present we are going to present your patient's file for prior authorization to insurance.  Pending prior authorization, I anticipate a surgical date in the near future.  We will keep you updated on any progress.  If you have questions regarding care please feel free to contact me.  Total time spent in the clinic was 50 minutes with greater than 50% in face-to-face consultation.        Sincerely,    Castro Cardenas    Please route or send letter to:  Primary Care Provider (PCP) and Referring Provider

## 2019-01-24 ENCOUNTER — ALLIED HEALTH/NURSE VISIT (OUTPATIENT)
Dept: SURGERY | Facility: CLINIC | Age: 54
End: 2019-01-24
Payer: COMMERCIAL

## 2019-01-24 DIAGNOSIS — E66.01 MORBID OBESITY (H): Primary | ICD-10-CM

## 2019-01-24 PROCEDURE — 99207 ZZC NO CHARGE NURSE ONLY: CPT

## 2019-01-24 NOTE — PROGRESS NOTES
Bariatric pre op class completed.  Class power point and patient checklist information gone over with patient.  Patient verbalized understanding of tasks needed to complete before surgery.  Patient also verbalized understanding of the power point and patient checklist information.  All questions were answered.  Quiz was completed.  Patient was advised to call if further questions.  Naa Gonsales, MS, RD, RN

## 2019-02-27 ENCOUNTER — HOSPITAL ENCOUNTER (INPATIENT)
Facility: CLINIC | Age: 54
LOS: 2 days | Discharge: HOME | DRG: 620 | End: 2019-03-01
Attending: SURGERY | Admitting: SURGERY
Payer: COMMERCIAL

## 2019-02-27 ENCOUNTER — ANESTHESIA EVENT (OUTPATIENT)
Dept: SURGERY | Facility: CLINIC | Age: 54
DRG: 620 | End: 2019-02-27
Payer: COMMERCIAL

## 2019-02-27 ENCOUNTER — APPOINTMENT (OUTPATIENT)
Dept: SURGERY | Facility: PHYSICIAN GROUP | Age: 54
End: 2019-02-27
Payer: COMMERCIAL

## 2019-02-27 ENCOUNTER — ANESTHESIA (OUTPATIENT)
Dept: SURGERY | Facility: CLINIC | Age: 54
DRG: 620 | End: 2019-02-27
Payer: COMMERCIAL

## 2019-02-27 DIAGNOSIS — E66.01 MORBID OBESITY WITH BMI OF 45.0-49.9, ADULT (H): Primary | ICD-10-CM

## 2019-02-27 LAB
CREAT SERPL-MCNC: 0.68 MG/DL (ref 0.52–1.04)
GFR SERPL CREATININE-BSD FRML MDRD: >90 ML/MIN/{1.73_M2}
HCG UR QL: NEGATIVE
HGB BLD-MCNC: 14.4 G/DL (ref 11.7–15.7)
PLATELET # BLD AUTO: 261 10E9/L (ref 150–450)

## 2019-02-27 PROCEDURE — 36415 COLL VENOUS BLD VENIPUNCTURE: CPT | Performed by: ANESTHESIOLOGY

## 2019-02-27 PROCEDURE — 25000125 ZZHC RX 250: Performed by: NURSE ANESTHETIST, CERTIFIED REGISTERED

## 2019-02-27 PROCEDURE — 0DB64Z3 EXCISION OF STOMACH, PERCUTANEOUS ENDOSCOPIC APPROACH, VERTICAL: ICD-10-PCS | Performed by: SURGERY

## 2019-02-27 PROCEDURE — 25800030 ZZH RX IP 258 OP 636: Performed by: ANESTHESIOLOGY

## 2019-02-27 PROCEDURE — 25800030 ZZH RX IP 258 OP 636: Performed by: NURSE ANESTHETIST, CERTIFIED REGISTERED

## 2019-02-27 PROCEDURE — 36000063 ZZH SURGERY LEVEL 4 EA 15 ADDTL MIN: Performed by: SURGERY

## 2019-02-27 PROCEDURE — 88300 SURGICAL PATH GROSS: CPT | Performed by: SURGERY

## 2019-02-27 PROCEDURE — 82565 ASSAY OF CREATININE: CPT | Performed by: PHYSICIAN ASSISTANT

## 2019-02-27 PROCEDURE — 85018 HEMOGLOBIN: CPT | Performed by: ANESTHESIOLOGY

## 2019-02-27 PROCEDURE — 25000128 H RX IP 250 OP 636: Performed by: SURGERY

## 2019-02-27 PROCEDURE — 27210794 ZZH OR GENERAL SUPPLY STERILE: Performed by: SURGERY

## 2019-02-27 PROCEDURE — 25800025 ZZH RX 258: Performed by: SURGERY

## 2019-02-27 PROCEDURE — 85049 AUTOMATED PLATELET COUNT: CPT | Performed by: PHYSICIAN ASSISTANT

## 2019-02-27 PROCEDURE — 25000125 ZZHC RX 250: Performed by: SURGERY

## 2019-02-27 PROCEDURE — 25000132 ZZH RX MED GY IP 250 OP 250 PS 637: Performed by: PHYSICIAN ASSISTANT

## 2019-02-27 PROCEDURE — 25000125 ZZHC RX 250: Performed by: ANESTHESIOLOGY

## 2019-02-27 PROCEDURE — 81025 URINE PREGNANCY TEST: CPT | Performed by: ANESTHESIOLOGY

## 2019-02-27 PROCEDURE — 25000128 H RX IP 250 OP 636: Performed by: ANESTHESIOLOGY

## 2019-02-27 PROCEDURE — 25000125 ZZHC RX 250: Performed by: PHYSICIAN ASSISTANT

## 2019-02-27 PROCEDURE — 25000128 H RX IP 250 OP 636: Performed by: PHYSICIAN ASSISTANT

## 2019-02-27 PROCEDURE — 25000128 H RX IP 250 OP 636: Performed by: NURSE ANESTHETIST, CERTIFIED REGISTERED

## 2019-02-27 PROCEDURE — 71000012 ZZH RECOVERY PHASE 1 LEVEL 1 FIRST HR: Performed by: SURGERY

## 2019-02-27 PROCEDURE — 43775 LAP SLEEVE GASTRECTOMY: CPT | Performed by: SURGERY

## 2019-02-27 PROCEDURE — 36415 COLL VENOUS BLD VENIPUNCTURE: CPT | Performed by: PHYSICIAN ASSISTANT

## 2019-02-27 PROCEDURE — 12000000 ZZH R&B MED SURG/OB

## 2019-02-27 PROCEDURE — 25800030 ZZH RX IP 258 OP 636: Performed by: PHYSICIAN ASSISTANT

## 2019-02-27 PROCEDURE — 37000008 ZZH ANESTHESIA TECHNICAL FEE, 1ST 30 MIN: Performed by: SURGERY

## 2019-02-27 PROCEDURE — 71000013 ZZH RECOVERY PHASE 1 LEVEL 1 EA ADDTL HR: Performed by: SURGERY

## 2019-02-27 PROCEDURE — 88300 SURGICAL PATH GROSS: CPT | Mod: 26 | Performed by: SURGERY

## 2019-02-27 PROCEDURE — 40000170 ZZH STATISTIC PRE-PROCEDURE ASSESSMENT II: Performed by: SURGERY

## 2019-02-27 PROCEDURE — 36000093 ZZH SURGERY LEVEL 4 1ST 30 MIN: Performed by: SURGERY

## 2019-02-27 PROCEDURE — 37000009 ZZH ANESTHESIA TECHNICAL FEE, EACH ADDTL 15 MIN: Performed by: SURGERY

## 2019-02-27 PROCEDURE — 25000566 ZZH SEVOFLURANE, EA 15 MIN: Performed by: SURGERY

## 2019-02-27 PROCEDURE — 43775 LAP SLEEVE GASTRECTOMY: CPT | Mod: AS | Performed by: PHYSICIAN ASSISTANT

## 2019-02-27 RX ORDER — LIDOCAINE HYDROCHLORIDE 20 MG/ML
INJECTION, SOLUTION INFILTRATION; PERINEURAL PRN
Status: DISCONTINUED | OUTPATIENT
Start: 2019-02-27 | End: 2019-02-27

## 2019-02-27 RX ORDER — PROMETHAZINE HYDROCHLORIDE 25 MG/ML
12.5 INJECTION, SOLUTION INTRAMUSCULAR; INTRAVENOUS ONCE
Status: COMPLETED | OUTPATIENT
Start: 2019-02-27 | End: 2019-02-27

## 2019-02-27 RX ORDER — ACETAMINOPHEN 325 MG/1
325-650 TABLET ORAL 2 TIMES DAILY PRN
Status: ON HOLD | COMMUNITY
End: 2019-02-28

## 2019-02-27 RX ORDER — HYDROMORPHONE HYDROCHLORIDE 1 MG/ML
.3-.5 INJECTION, SOLUTION INTRAMUSCULAR; INTRAVENOUS; SUBCUTANEOUS
Status: DISCONTINUED | OUTPATIENT
Start: 2019-02-27 | End: 2019-03-01 | Stop reason: HOSPADM

## 2019-02-27 RX ORDER — SCOLOPAMINE TRANSDERMAL SYSTEM 1 MG/1
1 PATCH, EXTENDED RELEASE TRANSDERMAL
Status: DISCONTINUED | OUTPATIENT
Start: 2019-02-27 | End: 2019-02-27 | Stop reason: HOSPADM

## 2019-02-27 RX ORDER — DIAZEPAM 10 MG/2ML
5-10 INJECTION, SOLUTION INTRAMUSCULAR; INTRAVENOUS EVERY 4 HOURS PRN
Status: DISCONTINUED | OUTPATIENT
Start: 2019-02-27 | End: 2019-03-01 | Stop reason: HOSPADM

## 2019-02-27 RX ORDER — SODIUM CHLORIDE, SODIUM LACTATE, POTASSIUM CHLORIDE, CALCIUM CHLORIDE 600; 310; 30; 20 MG/100ML; MG/100ML; MG/100ML; MG/100ML
INJECTION, SOLUTION INTRAVENOUS CONTINUOUS
Status: DISCONTINUED | OUTPATIENT
Start: 2019-02-27 | End: 2019-02-27 | Stop reason: HOSPADM

## 2019-02-27 RX ORDER — HEPARIN SODIUM 5000 [USP'U]/.5ML
5000 INJECTION, SOLUTION INTRAVENOUS; SUBCUTANEOUS
Status: COMPLETED | OUTPATIENT
Start: 2019-02-27 | End: 2019-02-27

## 2019-02-27 RX ORDER — ONDANSETRON 2 MG/ML
4 INJECTION INTRAMUSCULAR; INTRAVENOUS EVERY 6 HOURS PRN
Status: DISCONTINUED | OUTPATIENT
Start: 2019-02-27 | End: 2019-03-01 | Stop reason: HOSPADM

## 2019-02-27 RX ORDER — NALOXONE HYDROCHLORIDE 0.4 MG/ML
.1-.4 INJECTION, SOLUTION INTRAMUSCULAR; INTRAVENOUS; SUBCUTANEOUS
Status: DISCONTINUED | OUTPATIENT
Start: 2019-02-27 | End: 2019-03-01 | Stop reason: HOSPADM

## 2019-02-27 RX ORDER — SODIUM CHLORIDE, SODIUM LACTATE, POTASSIUM CHLORIDE, CALCIUM CHLORIDE 600; 310; 30; 20 MG/100ML; MG/100ML; MG/100ML; MG/100ML
INJECTION, SOLUTION INTRAVENOUS CONTINUOUS
Status: DISCONTINUED | OUTPATIENT
Start: 2019-02-27 | End: 2019-03-01 | Stop reason: HOSPADM

## 2019-02-27 RX ORDER — FENTANYL CITRATE 50 UG/ML
25-50 INJECTION, SOLUTION INTRAMUSCULAR; INTRAVENOUS
Status: DISCONTINUED | OUTPATIENT
Start: 2019-02-27 | End: 2019-02-27 | Stop reason: HOSPADM

## 2019-02-27 RX ORDER — VECURONIUM BROMIDE 1 MG/ML
INJECTION, POWDER, LYOPHILIZED, FOR SOLUTION INTRAVENOUS PRN
Status: DISCONTINUED | OUTPATIENT
Start: 2019-02-27 | End: 2019-02-27

## 2019-02-27 RX ORDER — DEXAMETHASONE SODIUM PHOSPHATE 4 MG/ML
INJECTION, SOLUTION INTRA-ARTICULAR; INTRALESIONAL; INTRAMUSCULAR; INTRAVENOUS; SOFT TISSUE PRN
Status: DISCONTINUED | OUTPATIENT
Start: 2019-02-27 | End: 2019-02-27

## 2019-02-27 RX ORDER — PRAVASTATIN SODIUM 20 MG
40 TABLET ORAL EVERY EVENING
Status: DISCONTINUED | OUTPATIENT
Start: 2019-02-27 | End: 2019-03-01 | Stop reason: HOSPADM

## 2019-02-27 RX ORDER — SCOLOPAMINE TRANSDERMAL SYSTEM 1 MG/1
1 PATCH, EXTENDED RELEASE TRANSDERMAL
Status: DISCONTINUED | OUTPATIENT
Start: 2019-02-27 | End: 2019-03-01 | Stop reason: HOSPADM

## 2019-02-27 RX ORDER — LORAZEPAM 2 MG/ML
.5-1 INJECTION INTRAMUSCULAR
Status: DISCONTINUED | OUTPATIENT
Start: 2019-02-27 | End: 2019-02-27 | Stop reason: HOSPADM

## 2019-02-27 RX ORDER — KETOROLAC TROMETHAMINE 15 MG/ML
15 INJECTION, SOLUTION INTRAMUSCULAR; INTRAVENOUS
Status: DISCONTINUED | OUTPATIENT
Start: 2019-02-27 | End: 2019-02-27 | Stop reason: HOSPADM

## 2019-02-27 RX ORDER — BUPIVACAINE HYDROCHLORIDE AND EPINEPHRINE 5; 5 MG/ML; UG/ML
30 INJECTION, SOLUTION PERINEURAL ONCE
Status: COMPLETED | OUTPATIENT
Start: 2019-02-27 | End: 2019-02-27

## 2019-02-27 RX ORDER — DIPHENHYDRAMINE HYDROCHLORIDE 50 MG/ML
25 INJECTION INTRAMUSCULAR; INTRAVENOUS EVERY 6 HOURS PRN
Status: DISCONTINUED | OUTPATIENT
Start: 2019-02-27 | End: 2019-03-01 | Stop reason: HOSPADM

## 2019-02-27 RX ORDER — FENTANYL CITRATE 50 UG/ML
INJECTION, SOLUTION INTRAMUSCULAR; INTRAVENOUS PRN
Status: DISCONTINUED | OUTPATIENT
Start: 2019-02-27 | End: 2019-02-27

## 2019-02-27 RX ORDER — MAGNESIUM HYDROXIDE 1200 MG/15ML
LIQUID ORAL PRN
Status: DISCONTINUED | OUTPATIENT
Start: 2019-02-27 | End: 2019-02-27 | Stop reason: HOSPADM

## 2019-02-27 RX ORDER — ACETAMINOPHEN 325 MG/1
650 TABLET ORAL
Status: DISCONTINUED | OUTPATIENT
Start: 2019-02-27 | End: 2019-03-01 | Stop reason: HOSPADM

## 2019-02-27 RX ORDER — CHOLECALCIFEROL (VITAMIN D3) 50 MCG
2000 TABLET ORAL
COMMUNITY
End: 2022-11-08

## 2019-02-27 RX ORDER — PROCHLORPERAZINE MALEATE 5 MG
10 TABLET ORAL EVERY 6 HOURS PRN
Status: DISCONTINUED | OUTPATIENT
Start: 2019-02-27 | End: 2019-03-01 | Stop reason: HOSPADM

## 2019-02-27 RX ORDER — ZOLMITRIPTAN 5 MG/1
5 TABLET, FILM COATED ORAL
COMMUNITY
End: 2022-01-03

## 2019-02-27 RX ORDER — NEOSTIGMINE METHYLSULFATE 1 MG/ML
VIAL (ML) INJECTION PRN
Status: DISCONTINUED | OUTPATIENT
Start: 2019-02-27 | End: 2019-02-27

## 2019-02-27 RX ORDER — HYDROMORPHONE HYDROCHLORIDE 1 MG/ML
.3-.5 INJECTION, SOLUTION INTRAMUSCULAR; INTRAVENOUS; SUBCUTANEOUS EVERY 5 MIN PRN
Status: DISCONTINUED | OUTPATIENT
Start: 2019-02-27 | End: 2019-02-27 | Stop reason: HOSPADM

## 2019-02-27 RX ORDER — ONDANSETRON 2 MG/ML
4 INJECTION INTRAMUSCULAR; INTRAVENOUS EVERY 30 MIN PRN
Status: DISCONTINUED | OUTPATIENT
Start: 2019-02-27 | End: 2019-02-27 | Stop reason: HOSPADM

## 2019-02-27 RX ORDER — ONDANSETRON 2 MG/ML
INJECTION INTRAMUSCULAR; INTRAVENOUS PRN
Status: DISCONTINUED | OUTPATIENT
Start: 2019-02-27 | End: 2019-02-27

## 2019-02-27 RX ORDER — OXYCODONE HYDROCHLORIDE 5 MG/1
5-10 TABLET ORAL
Status: DISCONTINUED | OUTPATIENT
Start: 2019-02-27 | End: 2019-03-01 | Stop reason: HOSPADM

## 2019-02-27 RX ORDER — LIDOCAINE 40 MG/G
CREAM TOPICAL
Status: DISCONTINUED | OUTPATIENT
Start: 2019-02-27 | End: 2019-03-01 | Stop reason: HOSPADM

## 2019-02-27 RX ORDER — PROPOFOL 10 MG/ML
INJECTION, EMULSION INTRAVENOUS PRN
Status: DISCONTINUED | OUTPATIENT
Start: 2019-02-27 | End: 2019-02-27

## 2019-02-27 RX ORDER — MEPERIDINE HYDROCHLORIDE 25 MG/ML
12.5 INJECTION INTRAMUSCULAR; INTRAVENOUS; SUBCUTANEOUS EVERY 5 MIN PRN
Status: DISCONTINUED | OUTPATIENT
Start: 2019-02-27 | End: 2019-02-27 | Stop reason: HOSPADM

## 2019-02-27 RX ORDER — CEFAZOLIN SODIUM IN 0.9 % NACL 3 G/100 ML
3 INTRAVENOUS SOLUTION, PIGGYBACK (ML) INTRAVENOUS
Status: COMPLETED | OUTPATIENT
Start: 2019-02-27 | End: 2019-02-27

## 2019-02-27 RX ORDER — ONDANSETRON 4 MG/1
4 TABLET, ORALLY DISINTEGRATING ORAL EVERY 6 HOURS PRN
Status: DISCONTINUED | OUTPATIENT
Start: 2019-02-27 | End: 2019-03-01 | Stop reason: HOSPADM

## 2019-02-27 RX ORDER — GLYCOPYRROLATE 0.2 MG/ML
INJECTION, SOLUTION INTRAMUSCULAR; INTRAVENOUS PRN
Status: DISCONTINUED | OUTPATIENT
Start: 2019-02-27 | End: 2019-02-27

## 2019-02-27 RX ORDER — CEFAZOLIN SODIUM 2 G/100ML
2 INJECTION, SOLUTION INTRAVENOUS EVERY 8 HOURS
Status: COMPLETED | OUTPATIENT
Start: 2019-02-27 | End: 2019-02-28

## 2019-02-27 RX ORDER — ONDANSETRON 4 MG/1
4 TABLET, ORALLY DISINTEGRATING ORAL EVERY 30 MIN PRN
Status: DISCONTINUED | OUTPATIENT
Start: 2019-02-27 | End: 2019-02-27 | Stop reason: HOSPADM

## 2019-02-27 RX ORDER — NALOXONE HYDROCHLORIDE 0.4 MG/ML
.1-.4 INJECTION, SOLUTION INTRAMUSCULAR; INTRAVENOUS; SUBCUTANEOUS
Status: ACTIVE | OUTPATIENT
Start: 2019-02-27 | End: 2019-02-28

## 2019-02-27 RX ORDER — ALBUTEROL SULFATE 0.83 MG/ML
2.5 SOLUTION RESPIRATORY (INHALATION) EVERY 4 HOURS PRN
Status: DISCONTINUED | OUTPATIENT
Start: 2019-02-27 | End: 2019-02-27 | Stop reason: HOSPADM

## 2019-02-27 RX ORDER — DIPHENHYDRAMINE HCL 25 MG
25 CAPSULE ORAL EVERY 6 HOURS PRN
Status: DISCONTINUED | OUTPATIENT
Start: 2019-02-27 | End: 2019-03-01 | Stop reason: HOSPADM

## 2019-02-27 RX ADMIN — PROPOFOL 200 MG: 10 INJECTION, EMULSION INTRAVENOUS at 10:42

## 2019-02-27 RX ADMIN — SODIUM CHLORIDE, POTASSIUM CHLORIDE, SODIUM LACTATE AND CALCIUM CHLORIDE: 600; 310; 30; 20 INJECTION, SOLUTION INTRAVENOUS at 19:36

## 2019-02-27 RX ADMIN — PROMETHAZINE HYDROCHLORIDE 12.5 MG: 25 INJECTION INTRAMUSCULAR; INTRAVENOUS at 13:07

## 2019-02-27 RX ADMIN — Medication 0.5 MG: at 18:10

## 2019-02-27 RX ADMIN — MEPERIDINE HYDROCHLORIDE 12.5 MG: 25 INJECTION INTRAMUSCULAR; INTRAVENOUS; SUBCUTANEOUS at 13:13

## 2019-02-27 RX ADMIN — ONDANSETRON 4 MG: 2 INJECTION INTRAMUSCULAR; INTRAVENOUS at 11:39

## 2019-02-27 RX ADMIN — GLYCOPYRROLATE 0.8 MG: 0.2 INJECTION, SOLUTION INTRAMUSCULAR; INTRAVENOUS at 11:59

## 2019-02-27 RX ADMIN — ONDANSETRON 4 MG: 2 INJECTION INTRAMUSCULAR; INTRAVENOUS at 12:00

## 2019-02-27 RX ADMIN — DEXAMETHASONE SODIUM PHOSPHATE 4 MG: 4 INJECTION, SOLUTION INTRA-ARTICULAR; INTRALESIONAL; INTRAMUSCULAR; INTRAVENOUS; SOFT TISSUE at 10:50

## 2019-02-27 RX ADMIN — MEPERIDINE HYDROCHLORIDE 12.5 MG: 25 INJECTION INTRAMUSCULAR; INTRAVENOUS; SUBCUTANEOUS at 14:22

## 2019-02-27 RX ADMIN — Medication 0.5 MG: at 23:05

## 2019-02-27 RX ADMIN — Medication 0.3 MG: at 16:59

## 2019-02-27 RX ADMIN — ROCURONIUM BROMIDE 50 MG: 10 INJECTION INTRAVENOUS at 10:42

## 2019-02-27 RX ADMIN — FENTANYL CITRATE 50 MCG: 50 INJECTION, SOLUTION INTRAMUSCULAR; INTRAVENOUS at 11:26

## 2019-02-27 RX ADMIN — VECURONIUM BROMIDE 2 MG: 1 INJECTION, POWDER, LYOPHILIZED, FOR SOLUTION INTRAVENOUS at 11:38

## 2019-02-27 RX ADMIN — SODIUM CHLORIDE, POTASSIUM CHLORIDE, SODIUM LACTATE AND CALCIUM CHLORIDE: 600; 310; 30; 20 INJECTION, SOLUTION INTRAVENOUS at 10:23

## 2019-02-27 RX ADMIN — SODIUM CHLORIDE, POTASSIUM CHLORIDE, SODIUM LACTATE AND CALCIUM CHLORIDE: 600; 310; 30; 20 INJECTION, SOLUTION INTRAVENOUS at 11:20

## 2019-02-27 RX ADMIN — HYDROMORPHONE HYDROCHLORIDE 0.5 MG: 1 INJECTION, SOLUTION INTRAMUSCULAR; INTRAVENOUS; SUBCUTANEOUS at 11:04

## 2019-02-27 RX ADMIN — SODIUM CHLORIDE, POTASSIUM CHLORIDE, SODIUM LACTATE AND CALCIUM CHLORIDE: 600; 310; 30; 20 INJECTION, SOLUTION INTRAVENOUS at 15:24

## 2019-02-27 RX ADMIN — ONDANSETRON 4 MG: 2 INJECTION INTRAMUSCULAR; INTRAVENOUS at 23:05

## 2019-02-27 RX ADMIN — DEXMEDETOMIDINE HYDROCHLORIDE 0.2 MCG/KG/HR: 100 INJECTION, SOLUTION INTRAVENOUS at 10:42

## 2019-02-27 RX ADMIN — SCOPALAMINE 1 PATCH: 1 PATCH, EXTENDED RELEASE TRANSDERMAL at 10:23

## 2019-02-27 RX ADMIN — Medication 0.5 MG: at 19:35

## 2019-02-27 RX ADMIN — Medication 3 G: at 10:46

## 2019-02-27 RX ADMIN — PROCHLORPERAZINE EDISYLATE 5 MG: 5 INJECTION INTRAMUSCULAR; INTRAVENOUS at 12:20

## 2019-02-27 RX ADMIN — VECURONIUM BROMIDE 2 MG: 1 INJECTION, POWDER, LYOPHILIZED, FOR SOLUTION INTRAVENOUS at 11:00

## 2019-02-27 RX ADMIN — MIDAZOLAM 2 MG: 1 INJECTION INTRAMUSCULAR; INTRAVENOUS at 10:36

## 2019-02-27 RX ADMIN — NEOSTIGMINE METHYLSULFATE 5 MG: 1 INJECTION, SOLUTION INTRAVENOUS at 11:59

## 2019-02-27 RX ADMIN — ONDANSETRON 4 MG: 2 INJECTION INTRAMUSCULAR; INTRAVENOUS at 16:55

## 2019-02-27 RX ADMIN — PROCHLORPERAZINE EDISYLATE 5 MG: 5 INJECTION INTRAMUSCULAR; INTRAVENOUS at 12:33

## 2019-02-27 RX ADMIN — LIDOCAINE HYDROCHLORIDE 100 MG: 20 INJECTION, SOLUTION INFILTRATION; PERINEURAL at 10:42

## 2019-02-27 RX ADMIN — FENTANYL CITRATE 100 MCG: 50 INJECTION, SOLUTION INTRAMUSCULAR; INTRAVENOUS at 10:42

## 2019-02-27 RX ADMIN — HEPARIN SODIUM 5000 UNITS: 10000 INJECTION, SOLUTION INTRAVENOUS; SUBCUTANEOUS at 10:55

## 2019-02-27 RX ADMIN — FAMOTIDINE 20 MG: 10 INJECTION, SOLUTION INTRAVENOUS at 17:01

## 2019-02-27 RX ADMIN — Medication 0.5 MG: at 21:09

## 2019-02-27 RX ADMIN — HYDROMORPHONE HYDROCHLORIDE 0.5 MG: 1 INJECTION, SOLUTION INTRAMUSCULAR; INTRAVENOUS; SUBCUTANEOUS at 11:21

## 2019-02-27 RX ADMIN — CEFAZOLIN SODIUM 2 G: 2 INJECTION, SOLUTION INTRAVENOUS at 17:03

## 2019-02-27 ASSESSMENT — ENCOUNTER SYMPTOMS
SEIZURES: 0
DYSRHYTHMIAS: 0

## 2019-02-27 ASSESSMENT — ACTIVITIES OF DAILY LIVING (ADL)
ADLS_ACUITY_SCORE: 11
ADLS_ACUITY_SCORE: 11

## 2019-02-27 ASSESSMENT — MIFFLIN-ST. JEOR: SCORE: 2116.82

## 2019-02-27 ASSESSMENT — COLUMBIA-SUICIDE SEVERITY RATING SCALE - C-SSRS
1. IN THE PAST MONTH, HAVE YOU WISHED YOU WERE DEAD OR WISHED YOU COULD GO TO SLEEP AND NOT WAKE UP?: NO
2. HAVE YOU ACTUALLY HAD ANY THOUGHTS OF KILLING YOURSELF IN THE PAST MONTH?: NO

## 2019-02-27 ASSESSMENT — LIFESTYLE VARIABLES: TOBACCO_USE: 0

## 2019-02-27 ASSESSMENT — COPD QUESTIONNAIRES: COPD: 0

## 2019-02-27 NOTE — PROGRESS NOTES
Admission medication history interview status for the 2/27/2019  admission is complete. See EPIC admission navigator for prior to admission medications     Medication history source reliability:Good    Medication history interview source(s):Patient    Medication history resources (including written lists, pill bottles, clinic record):None    Primary pharmacy.[facility]    Additional medication history information not noted on PTA med list :None    Time spent in this activity: 40 minutes    Prior to Admission medications    Medication Sig Last Dose Taking? Auth Provider   acetaminophen (TYLENOL) 325 MG tablet Take 325-650 mg by mouth 2 times daily as needed for mild pain 2/26/2019 at Afternoon Yes Reported, Patient   Calcium Carbonate (CALCIUM 600 PO) Take 600 mg by mouth 2 times daily (with meals) With Lunch and Dinner 2/25/2019 at PM Yes Reported, Patient   childrens multivitamin w/iron (FLINTSTONES COMPLETE) 60 MG chewable tablet Take 1 chew tab by mouth daily 2/26/2019 at AM Yes Reported, Patient   levonorgestrel (MIRENA, 52 MG,) 20 MCG/24HR IUD 1 each by Intrauterine route once In place Yes Reported, Patient   pravastatin (PRAVACHOL) 40 MG tablet Take 40 mg by mouth every evening  2/25/2019 at PM Yes Reported, Patient   sertraline (ZOLOFT) 50 MG tablet Take 50 mg by mouth daily 2/26/2019 at AM Yes Reported, Patient   vitamin D3 (CHOLECALCIFEROL) 2000 units tablet Take 2,000 Units by mouth daily (with dinner) 2/25/2019 at PM Yes Reported, Patient   ZOLMitriptan (ZOMIG) 5 MG tablet Take 5 mg by mouth at onset of headache for migraine More than a Month at PRN Yes Reported, Patient

## 2019-02-27 NOTE — ANESTHESIA PREPROCEDURE EVALUATION
Anesthesia Pre-Procedure Evaluation    Patient: Laura Nava   MRN: [id number] : 1965          Preoperative Diagnosis: MORBID OBESITY    Procedure(s):  LAPAROSCOPIC GASTRIC SLEEVE , POSSIBLE LAPAROSCOPIC CHOLECYSTECTOMY    Past Medical History:   Diagnosis Date     Anxiety     zoloft     Anxiety      Hyperlipemia      Migraines Approx     Under Dr. Reyes neurologist care     MTHFR mutation (H)      Past Surgical History:   Procedure Laterality Date     ENT SURGERY         Anesthesia Evaluation     .             ROS/MED HX    ENT/Pulmonary:      (-) tobacco use, asthma, COPD and sleep apnea   Neurologic:     (+)migraines,    (-) seizures, CVA and TIA   Cardiovascular:     (+) Dyslipidemia, ----. : . . . :. . Previous cardiac testing date:results:Stress Testdate:10/0218 results:Negative for inducible ischemia, EF 55%, no valvular abnormalitiesECG reviewed date:2018 results:NSR date: results:         (-) hypertension, CHF and arrhythmias   METS/Exercise Tolerance:     Hematologic:     (+) Other Hematologic Disorder-MTHFR mutation      Musculoskeletal:         GI/Hepatic:     (+) GERD Asymptomatic on medication,      (-) liver disease   Renal/Genitourinary:      (-) renal disease   Endo: Comment: BMI 47    (+) Obesity, .   (-) Type I DM and Type II DM   Psychiatric:     (+) psychiatric history anxiety      Infectious Disease:         Malignancy:         Other:                          Physical Exam  Normal systems: dental    Airway   Mallampati: II  TM distance: >3 FB  Neck ROM: full    Dental     Cardiovascular   Rhythm and rate: regular      Pulmonary    breath sounds clear to auscultation            Lab Results   Component Value Date    WBC 5.9 10/17/2018    HGB 14.0 10/17/2018    HCT 42.8 10/17/2018     10/17/2018     10/17/2018    POTASSIUM 3.9 10/17/2018    CHLORIDE 105 10/17/2018    CO2 23 10/17/2018    BUN 11 10/17/2018    CR 0.76 10/17/2018    GLC 79 10/17/2018    ODILIA  "8.8 10/17/2018    ALBUMIN 3.9 10/17/2018    PROTTOTAL 7.7 10/17/2018    ALT 25 10/17/2018    AST 24 10/17/2018    ALKPHOS 90 10/17/2018    BILITOTAL 0.8 10/17/2018       Preop Vitals  BP Readings from Last 3 Encounters:   01/03/19 129/76   10/17/18 132/75   10/03/18 124/88    Pulse Readings from Last 3 Encounters:   01/03/19 83   10/17/18 86   10/03/18 76      Resp Readings from Last 3 Encounters:   10/03/18 12    SpO2 Readings from Last 3 Encounters:   01/03/19 98%   10/17/18 99%   10/03/18 96%      Temp Readings from Last 1 Encounters:   No data found for Temp    Ht Readings from Last 1 Encounters:   01/03/19 1.753 m (5' 9\")      Wt Readings from Last 1 Encounters:   01/03/19 145.3 kg (320 lb 6.4 oz)    Estimated body mass index is 47.31 kg/m  as calculated from the following:    Height as of 1/3/19: 1.753 m (5' 9\").    Weight as of 1/3/19: 145.3 kg (320 lb 6.4 oz).       Anesthesia Plan      History & Physical Review  History and physical reviewed and following examination; no interval change.    ASA Status:  3 .    NPO Status:  > 8 hours    Plan for General and ETT with Intravenous and Propofol induction. Maintenance will be Balanced.    PONV prophylaxis:  Ondansetron (or other 5HT-3), Dexamethasone or Solumedrol and Scopolamine patch  Additional equipment: Videolaryngoscope Ramp for optimal intubating conditions      Postoperative Care  Postoperative pain management:  Multi-modal analgesia.      Consents  Anesthetic plan, risks, benefits and alternatives discussed with:  Patient..                 Curly Shin MD  "

## 2019-02-27 NOTE — PHARMACY-CONSULT NOTE
Bariatric Consult    Medications evaluated as requested per Bariatric Consult.     No medication changes were needed based on the Bariatric Medication Management Policy.    The pharmacist will continue to follow as new medications are ordered.    Rebeka Romero, TatumD, BCPS

## 2019-02-27 NOTE — PLAN OF CARE
Patient arrived from PACU @1445. AVSS except BP elevated. On room air sating at 99%. Patient refused CAPNO, continuous pulse ox on. Denies pain, SOB or nausea. LR at 150ml/hr. CMS intact. 5 lap sites with steri strips+band aides CDI except scant drainage. ABD binder and ice on. BS not audible, no gas. Tolerating ice chips well.

## 2019-02-27 NOTE — ANESTHESIA CARE TRANSFER NOTE
Patient: Laura Nava    Procedure(s):  LAPAROSCOPIC GASTRIC SLEEVE , POSSIBLE LAPAROSCOPIC CHOLECYSTECTOMY    Diagnosis: MORBID OBESITY  Diagnosis Additional Information: No value filed.    Anesthesia Type:   General, ETT     Note:  Airway :Face Mask  Patient transferred to:PACU  Comments: Suctioned, spont resp ,lifts head  > 5 sec. Extubated with immediate exchange, to PACU, report to RN.Handoff Report: Identifed the Patient, Identified the Reponsible Provider, Reviewed the pertinent medical history, Discussed the surgical course, Reviewed Intra-OP anesthesia mangement and issues during anesthesia, Set expectations for post-procedure period and Allowed opportunity for questions and acknowledgement of understanding      Vitals: (Last set prior to Anesthesia Care Transfer)    CRNA VITALS  2/27/2019 1137 - 2/27/2019 1220      2/27/2019             Pulse:  97    SpO2:  98 %    Resp Rate (observed):  12                Electronically Signed By: SERA Tran CRNA  February 27, 2019  12:20 PM

## 2019-02-27 NOTE — OP NOTE
PREOPERATIVE DIAGNOSIS: Morbid obesity with medical comorbidities including weightbearing joint pains and high cholesterol.  Failure of other methods of permanent weight loss including portion control, calorie counting, exercise, weight watchers, prepackaged meals systems, Slim fast, over-the-counter as well as prescription medications.  BMI at initial presentation of 48.04  POSTOPERATIVE DIAGNOSIS: Same  PROCEDURE: Laparoscopic sleeve gastrectomy.   SURGEON: Castro Cardenas MD  ASSISTANT: Vicki Bond PA-C, Physician assistant first assistant was necessary during the performance of this procedure for expertise in patient positioning, prepping, draping, trocar placement, camera management, retraction and exposure, and suctioning.  ANESTHESIA: General endotracheal.   ESTIMATED BLOOD LOSS: 10 mL.   DRAINS: None.   COMPLICATIONS: None.   SPECIMENS: Sleeve gastrectomy for gross only.   INDICATIONS: Laura Nava  has undergone the preoperative screening process through the [facility] and has been deemed an appropriate candidate for bariatric surgery. The patient was furnished with a copy of our specialized consent form for bariatric surgery. This document was reviewed with her in great detail. She  wished to undergo sleeve gastrectomy. This procedure was thoroughly discussed. The potential risks as outlined on our consent form were reviewed. All of her  questions were answered and she  wished to proceed.   DESCRIPTION OF PROCEDURE: After informed consent was obtained, the patient was taken to the operating room and placed supine on the operating table. Following the induction of adequate general endotracheal anesthesia, a Burton catheter was placed using aseptic technique and the abdomen was prepped and draped in the usual fashion. A timeout was completed. IV antibiotics as well as subq heparin were administered. A skin incision was then made to the left of the umbilicus in the mid  abdomen and a 12 mm optical trocar was used to gain access to the peritoneal cavity. Carbon dioxide pneumoperitoneum was established. A 10 mm 45 degree laparoscope was utilized for the procedure. The patient was placed in reverse Trendelenburg, and under direct vision, a Tigre liver retractor was introduced through a subxiphoid stab incision used to elevate the left lobe of the liver. The following ports were then placed under direct vision: The 15 mm port was placed in the right subcostal position, 5 mm port was placed to the left of midline in the upper abdomen, a 12 mm port was placed in the left anterior axillary line. The angle of His was then taken down. I identified the pylorus and measured back 4-5 cm proximal to this. I then mobilized the entire greater curvature of the stomach up to and including taking down the angle of His. The retrogastric space was cleared of any adhesions such that the stomach was free and mobile on its blood supply from the left gastric artery. A 40 Italian Estrada dilator was then passed transorally and directed into the distal gastric antrum. Using this as a sizing tube, the sleeve gastrectomy was then started. The first firing of the 60 mm black tri load stapler prepped with staple line reinforcement was deployed approximately 4-5 cm proximal to pylorus in the direction of the angularis. I then marched up along the Estrada dilator completing the sleeve gastrectomy with several additional firings of the 60 mm black and then purple tri loads of the stapler prepped with staple line reinforcement. There was slight angle out at the level of the angle of His. With the sleeve completed, the resected portion of stomach was removed from the abdominal cavity. The staple line was then tacked to the mobilized omentum using interrupted 2-0 silk sutures. The sleeve sat in a nice anatomic position without angulation or kinking. The dilator was then removed. The Tigre retractor was  removed. The fascia at the 15 mm port in the right abdomen which had been dilated for specimen removal was then closed using a fascial closure device and 0 Vicryl tie. Remaining trocars were all then removed. Carbon dioxide was massaged from the abdomen. Local anesthetic was injected and skin incisions were closed with subcuticular 4-0 Vicryl stitches. Benzoin and Steri-Strips were applied. Needle and sponge counts were correct. The patient tolerated this well, was awakened in the operating room, extubated and taken to recovery room in stable condition.   AMELIA HERNADEZ MD

## 2019-02-28 LAB
ANION GAP SERPL CALCULATED.3IONS-SCNC: 6 MMOL/L (ref 3–14)
BUN SERPL-MCNC: 9 MG/DL (ref 7–30)
CALCIUM SERPL-MCNC: 7.9 MG/DL (ref 8.5–10.1)
CHLORIDE SERPL-SCNC: 107 MMOL/L (ref 94–109)
CO2 SERPL-SCNC: 24 MMOL/L (ref 20–32)
COPATH REPORT: NORMAL
CREAT SERPL-MCNC: 0.71 MG/DL (ref 0.52–1.04)
GFR SERPL CREATININE-BSD FRML MDRD: >90 ML/MIN/{1.73_M2}
GLUCOSE SERPL-MCNC: 88 MG/DL (ref 70–99)
HGB BLD-MCNC: 13.2 G/DL (ref 11.7–15.7)
POTASSIUM SERPL-SCNC: 3.8 MMOL/L (ref 3.4–5.3)
SODIUM SERPL-SCNC: 137 MMOL/L (ref 133–144)

## 2019-02-28 PROCEDURE — 25000128 H RX IP 250 OP 636: Performed by: PHYSICIAN ASSISTANT

## 2019-02-28 PROCEDURE — 25000125 ZZHC RX 250: Performed by: PHYSICIAN ASSISTANT

## 2019-02-28 PROCEDURE — 25000132 ZZH RX MED GY IP 250 OP 250 PS 637: Performed by: PHYSICIAN ASSISTANT

## 2019-02-28 PROCEDURE — 80048 BASIC METABOLIC PNL TOTAL CA: CPT | Performed by: PHYSICIAN ASSISTANT

## 2019-02-28 PROCEDURE — 12000000 ZZH R&B MED SURG/OB

## 2019-02-28 PROCEDURE — 36415 COLL VENOUS BLD VENIPUNCTURE: CPT | Performed by: PHYSICIAN ASSISTANT

## 2019-02-28 PROCEDURE — 85018 HEMOGLOBIN: CPT | Performed by: PHYSICIAN ASSISTANT

## 2019-02-28 PROCEDURE — 25800030 ZZH RX IP 258 OP 636: Performed by: PHYSICIAN ASSISTANT

## 2019-02-28 RX ORDER — OXYCODONE HYDROCHLORIDE 5 MG/1
5-10 TABLET ORAL
Qty: 25 TABLET | Refills: 0 | Status: SHIPPED | OUTPATIENT
Start: 2019-02-28 | End: 2019-03-01

## 2019-02-28 RX ORDER — SIMETHICONE 20 MG/.3ML
40 EMULSION ORAL EVERY 6 HOURS PRN
Status: DISCONTINUED | OUTPATIENT
Start: 2019-02-28 | End: 2019-03-01 | Stop reason: HOSPADM

## 2019-02-28 RX ORDER — SCOLOPAMINE TRANSDERMAL SYSTEM 1 MG/1
1 PATCH, EXTENDED RELEASE TRANSDERMAL
Qty: 1 PATCH | Refills: 1 | Status: SHIPPED | OUTPATIENT
Start: 2019-02-28

## 2019-02-28 RX ORDER — ACETAMINOPHEN 325 MG/1
650 TABLET ORAL EVERY 4 HOURS PRN
Qty: 100 TABLET | COMMUNITY
Start: 2019-02-28 | End: 2019-03-01

## 2019-02-28 RX ORDER — ONDANSETRON 4 MG/1
4-8 TABLET, ORALLY DISINTEGRATING ORAL EVERY 6 HOURS PRN
Qty: 20 TABLET | Refills: 0 | Status: SHIPPED | OUTPATIENT
Start: 2019-02-28 | End: 2022-01-03

## 2019-02-28 RX ADMIN — Medication 0.5 MG: at 06:49

## 2019-02-28 RX ADMIN — ENOXAPARIN SODIUM 40 MG: 40 INJECTION SUBCUTANEOUS at 21:24

## 2019-02-28 RX ADMIN — SODIUM CHLORIDE, POTASSIUM CHLORIDE, SODIUM LACTATE AND CALCIUM CHLORIDE: 600; 310; 30; 20 INJECTION, SOLUTION INTRAVENOUS at 06:50

## 2019-02-28 RX ADMIN — Medication 0.5 MG: at 09:33

## 2019-02-28 RX ADMIN — ONDANSETRON 4 MG: 2 INJECTION INTRAMUSCULAR; INTRAVENOUS at 14:24

## 2019-02-28 RX ADMIN — FAMOTIDINE 20 MG: 10 INJECTION, SOLUTION INTRAVENOUS at 14:24

## 2019-02-28 RX ADMIN — PROCHLORPERAZINE EDISYLATE 10 MG: 5 INJECTION INTRAMUSCULAR; INTRAVENOUS at 23:57

## 2019-02-28 RX ADMIN — FAMOTIDINE 20 MG: 10 INJECTION, SOLUTION INTRAVENOUS at 02:32

## 2019-02-28 RX ADMIN — CEFAZOLIN SODIUM 2 G: 2 INJECTION, SOLUTION INTRAVENOUS at 00:37

## 2019-02-28 RX ADMIN — ONDANSETRON 4 MG: 2 INJECTION INTRAMUSCULAR; INTRAVENOUS at 05:06

## 2019-02-28 RX ADMIN — PROCHLORPERAZINE EDISYLATE 10 MG: 5 INJECTION INTRAMUSCULAR; INTRAVENOUS at 09:33

## 2019-02-28 RX ADMIN — ENOXAPARIN SODIUM 40 MG: 40 INJECTION SUBCUTANEOUS at 10:06

## 2019-02-28 RX ADMIN — Medication 0.5 MG: at 17:51

## 2019-02-28 RX ADMIN — SODIUM CHLORIDE, POTASSIUM CHLORIDE, SODIUM LACTATE AND CALCIUM CHLORIDE: 600; 310; 30; 20 INJECTION, SOLUTION INTRAVENOUS at 00:39

## 2019-02-28 RX ADMIN — ONDANSETRON 4 MG: 2 INJECTION INTRAMUSCULAR; INTRAVENOUS at 21:24

## 2019-02-28 RX ADMIN — SODIUM CHLORIDE, POTASSIUM CHLORIDE, SODIUM LACTATE AND CALCIUM CHLORIDE: 600; 310; 30; 20 INJECTION, SOLUTION INTRAVENOUS at 20:34

## 2019-02-28 RX ADMIN — Medication 0.5 MG: at 02:30

## 2019-02-28 RX ADMIN — Medication 0.5 MG: at 00:47

## 2019-02-28 RX ADMIN — SODIUM CHLORIDE, POTASSIUM CHLORIDE, SODIUM LACTATE AND CALCIUM CHLORIDE: 600; 310; 30; 20 INJECTION, SOLUTION INTRAVENOUS at 14:08

## 2019-02-28 RX ADMIN — Medication 0.5 MG: at 04:23

## 2019-02-28 ASSESSMENT — ACTIVITIES OF DAILY LIVING (ADL)
ADLS_ACUITY_SCORE: 11

## 2019-02-28 NOTE — PLAN OF CARE
A&Ox4. AVSS ex bradycardic. LS clear. Lap sites, intact, dried drainage. Abdominal binder in place. Tolerating bariatric clears. BS hypoactive, no flatus. PRN dilaudid for pain. PRN zofran and compazine given for nausea. SBA. Voiding.

## 2019-02-28 NOTE — PLAN OF CARE
Alert/oriented. VSS. 5 lap sites, intact, abdominal binder in place. Taking ice chips. BS inactive. Medicated with dilaudid q 1/1/2 hrs for pain control. Medicated with zofran x2 for generalized nausea, no emesis. Ambulating to  with one assist, voiding w/o difficulty.

## 2019-02-28 NOTE — PLAN OF CARE
A&Ox4. VSS ex. HTN on 2L over night. 5 lap sites, intact, dried drainage. abdominal binder in place. NPO ex. ice chips. BS hypoactive. PRN dilaudid for pain. PRN zofran x1 for nausea, no emesis. Ambulating to BR with SBAt, voiding w/o difficulty. Continue to Monitor.

## 2019-02-28 NOTE — CONSULTS
"NUTRITION EDUCATION    REASON FOR ASSESSMENT:  Bariatric Surgery Consult    CURRENT DIET:  Bariatric Clear Liquids    NUTRITION HISTORY:  Patient worked with clinical dietitian in [facility] Clinic prior to surgery to assist with lifestyle modification.    ANTHROPOMETRICS:   Ht: 5' 9\"  Wt: 318 lbs 0 oz (144.2 kg)  BMI: Body mass index is 46.96 kg/m .  IBW: 65.91 kg   %IBW: 219%    ASSESSED NUTRITION NEEDS:  Energy Needs: 4862-1677 kcal (11 - 14 kcal/kg ABW)                      Protein Needs: 66-99 g (1 - 1.5 gm/kg IBW)  Fluid Needs: 4645-7224 mL (1mL/kcal/ABW)    Know patient will not meet assessed needs due to the restrictive nature of surgery.    NUTRITION DIAGNOSIS:   Food- and nutrition related knowledge deficit related to bariatric surgery as evidence by laparoscopic sleeve gastrectomy surgery on 2/27/2019.    INTERVENTIONS:    Nutrition Prescription:    Recommended patient follow bariatric diet advancement for laparoscopic sleeve gastrectomy    Implementation:    Nutrition Education (Content):  a) Reviewed laparoscopic sleeve gastrectomy diet guidelines  b) Provided handouts:  Nutrition After laparoscopic sleeve gastrectomy and Vitamin and Mineral Supplements After Weight Loss Surgery    Nutrition Education (Application):  c) Discussed current eating habits and recommended alternative food choices  d) Patient verbalizes understanding of diet by listing appropriate foods for home    Anticipate good compliance    Diet Education - refer to Education Flowsheet    Goals:    Patient will follow bariatric diet advancement schedule    Patient will follow post-operative vitamin mineral schedule      Follow Up:    Patient to follow up in 2 weeks with RD in Weight Loss Clinic    Provided RD contact information for future questions      Xiomy Alva RD, LD  Clinical Dietitian     "

## 2019-02-28 NOTE — ANESTHESIA POSTPROCEDURE EVALUATION
Patient: Laura Nava    Procedure(s):  LAPAROSCOPIC GASTRIC SLEEVE , POSSIBLE LAPAROSCOPIC CHOLECYSTECTOMY    Diagnosis:MORBID OBESITY  Diagnosis Additional Information: No value filed.    Anesthesia Type:  General, ETT    Note:  Anesthesia Post Evaluation    Patient location during evaluation: Bedside  Patient participation: Able to fully participate in evaluation  Level of consciousness: awake and alert  Pain management: adequate  Airway patency: patent  Cardiovascular status: acceptable  Respiratory status: acceptable  Hydration status: acceptable  PONV: none             Last vitals:  Vitals:    02/27/19 1630 02/27/19 1745 02/27/19 1845   BP:      Pulse:      Resp: 16 14 16   Temp:      SpO2:            Electronically Signed By: Curly Shin MD  February 27, 2019  6:48 PM

## 2019-02-28 NOTE — PROGRESS NOTES
"Bariatric Surgery Progress Note         Assessment:      Laura Nava is a 53 year old female S/P lap gastric sleeve, POD #1   Morbid Obesity, BMI >45 with medical comorbidities including weight-bearing joint pain and high cholesterol         Plan:   - Continue anti-emetics prn, Scop patch in place  - Monitor blood pressure. Expect continued improvement as pain/nausea improves  - Doing well with ice chips, slowly start sips of water as nausea subsides. Advance as tolerated per protocol - clear liquids at lunch, full liquids tomorrow morning. Dietician consult  - Continue IV pain meds for now, transition to PO hopefully later today as able. Dilaudid, valium, simethicone available  - Lovenox bid while inpatient then daily at discharge for 28 days  - Pepcid IV, PCDs, incentive spirometer, ambulate at least QID    Dispo: continue to monitor, likely home tomorrow pending improvement        Interval History:   Afebrile overnight, mild hypertension. Patient feeling pretty run down and sore today. Using dilaudid for pain. Fair amount of nausea. Scop patch in place, using Zofran and Compazine. Nausea seems to be slowly improving. Tolerating ice chips overnight, throat is sore. Ambulating to the restroom, feels shaky on her feet. No chest pain, dizziness, shortness of breath.          Physical Exam:   /81 (BP Location: Right arm)   Pulse 50   Temp 98.4  F (36.9  C) (Oral)   Resp 16   Ht 1.753 m (5' 9\")   Wt 144.2 kg (318 lb)   SpO2 95%   BMI 46.96 kg/m    I/O last 3 completed shifts:  In: 3990 [P.O.:30; I.V.:3960]  Out: 760 [Urine:750; Blood:10]  General: NAD, pleasant, alert and oriented x3  Cardiovascular: regular rate and rhythm; S1 and S2 distinct without murmur  Respiratory: lungs clear to auscultation bilaterally without wheezes, rales or rhonchi   Abdomen: soft, binder in place. Appropriately tender around incisions. Nondistended  Incisions: clean, dry, intact with steris and bandaids in place. " Minimal ecchymosis. No surrounding erythema or drainage    Labs (most recent at bottom):  Hemoglobin   Date Value Ref Range Status   02/28/2019 13.2 11.7 - 15.7 g/dL Final       Results for orders placed or performed during the hospital encounter of 02/27/19 (from the past 24 hour(s))   Platelet count   Result Value Ref Range    Platelet Count 261 150 - 450 10e9/L   Creatinine   Result Value Ref Range    Creatinine 0.68 0.52 - 1.04 mg/dL    GFR Estimate >90 >60 mL/min/[1.73_m2]    GFR Estimate If Black >90 >60 mL/min/[1.73_m2]   Hemoglobin   Result Value Ref Range    Hemoglobin 13.2 11.7 - 15.7 g/dL   Basic metabolic panel   Result Value Ref Range    Sodium 137 133 - 144 mmol/L    Potassium 3.8 3.4 - 5.3 mmol/L    Chloride 107 94 - 109 mmol/L    Carbon Dioxide 24 20 - 32 mmol/L    Anion Gap 6 3 - 14 mmol/L    Glucose 88 70 - 99 mg/dL    Urea Nitrogen 9 7 - 30 mg/dL    Creatinine 0.71 0.52 - 1.04 mg/dL    GFR Estimate >90 >60 mL/min/[1.73_m2]    GFR Estimate If Black >90 >60 mL/min/[1.73_m2]    Calcium 7.9 (L) 8.5 - 10.1 mg/dL         Vicki Bond PA-C  [facility]  [phone]

## 2019-03-01 ENCOUNTER — DOCUMENTATION ONLY (OUTPATIENT)
Dept: OTHER | Facility: CLINIC | Age: 54
End: 2019-03-01

## 2019-03-01 VITALS
BODY MASS INDEX: 43.4 KG/M2 | OXYGEN SATURATION: 93 % | TEMPERATURE: 98.8 F | RESPIRATION RATE: 16 BRPM | HEIGHT: 69 IN | SYSTOLIC BLOOD PRESSURE: 161 MMHG | WEIGHT: 293 LBS | DIASTOLIC BLOOD PRESSURE: 75 MMHG | HEART RATE: 52 BPM

## 2019-03-01 PROBLEM — E66.01 MORBID OBESITY (H): Status: ACTIVE | Noted: 2019-03-01

## 2019-03-01 LAB — GLUCOSE BLDC GLUCOMTR-MCNC: 76 MG/DL (ref 70–99)

## 2019-03-01 PROCEDURE — 25000131 ZZH RX MED GY IP 250 OP 636 PS 637: Performed by: PHYSICIAN ASSISTANT

## 2019-03-01 PROCEDURE — 25800030 ZZH RX IP 258 OP 636: Performed by: PHYSICIAN ASSISTANT

## 2019-03-01 PROCEDURE — 25000128 H RX IP 250 OP 636: Performed by: PHYSICIAN ASSISTANT

## 2019-03-01 PROCEDURE — 25000125 ZZHC RX 250: Performed by: PHYSICIAN ASSISTANT

## 2019-03-01 PROCEDURE — 00000146 ZZHCL STATISTIC GLUCOSE BY METER IP

## 2019-03-01 PROCEDURE — 25000132 ZZH RX MED GY IP 250 OP 250 PS 637: Performed by: PHYSICIAN ASSISTANT

## 2019-03-01 RX ORDER — OXYCODONE HYDROCHLORIDE 5 MG/1
5-10 TABLET ORAL
Qty: 5 TABLET | Refills: 0 | Status: SHIPPED | OUTPATIENT
Start: 2019-03-01 | End: 2019-03-05

## 2019-03-01 RX ORDER — ACETAMINOPHEN 325 MG/1
650 TABLET ORAL EVERY 4 HOURS PRN
Qty: 40 TABLET | Refills: 1 | Status: SHIPPED | OUTPATIENT
Start: 2019-03-01 | End: 2023-08-11

## 2019-03-01 RX ADMIN — ENOXAPARIN SODIUM 40 MG: 40 INJECTION SUBCUTANEOUS at 11:06

## 2019-03-01 RX ADMIN — SODIUM CHLORIDE, POTASSIUM CHLORIDE, SODIUM LACTATE AND CALCIUM CHLORIDE: 600; 310; 30; 20 INJECTION, SOLUTION INTRAVENOUS at 03:05

## 2019-03-01 RX ADMIN — ACETAMINOPHEN 650 MG: 325 TABLET, FILM COATED ORAL at 11:06

## 2019-03-01 RX ADMIN — FAMOTIDINE 20 MG: 10 INJECTION, SOLUTION INTRAVENOUS at 03:56

## 2019-03-01 RX ADMIN — SERTRALINE HYDROCHLORIDE 50 MG: 50 TABLET ORAL at 11:06

## 2019-03-01 RX ADMIN — ONDANSETRON 4 MG: 4 TABLET, ORALLY DISINTEGRATING ORAL at 11:25

## 2019-03-01 RX ADMIN — ACETAMINOPHEN 650 MG: 325 TABLET, FILM COATED ORAL at 05:38

## 2019-03-01 RX ADMIN — RANITIDINE 150 MG: 150 TABLET ORAL at 11:06

## 2019-03-01 ASSESSMENT — ACTIVITIES OF DAILY LIVING (ADL)
ADLS_ACUITY_SCORE: 13

## 2019-03-01 NOTE — PLAN OF CARE
Dx: Morbid obesity. Hx: Joint pain + high cholesterol. Surg: Lap gastric sleeve. VSS ex bradycardic at times. A/Ox4. Labs: Calcium low, Hgb 13.2. Lap incisions x 5 dried drainage. CMS intact. Pain controlled with prn IV dilaudid, refusing oral medications at this time. Up with SBA, ambulating to bathroom. Tolerating clear bariatric diet. Intermittent N&V, Pt removed scope patch. -gas, -bm. Voiding adequately. LS clear. Will continue to monitor.

## 2019-03-01 NOTE — DISCHARGE SUMMARY
[facility] Discharge Summary    Laura Nava MRN# [id number]   Age: 53 year old YOB: 1965     Date of Admission:  2/27/2019  Date of Discharge:  3/1/2019 11:42 AM  Admitting Provider:  Castro Cardenas MD  Discharge Provider:  Vicki Bond PA-C  Discharging Service: Bariatric Surgery     Primary Provider: Vicki Ruiz  Primary Care Physician Phone Number: [phone]          Admission Diagnoses:   Principle Diagnosis: MORBID OBESITY  Morbid obesity with BMI of 45.0-49.9, adult (H) with medical comorbidities including high cholesterol    Secondary Diagnosis:  High risk for DVT         Discharge Diagnosis:   Same          Procedures:   Procedure(s): Laparoscopic sleeve gastrectomy            Discharge Medications:     Current Discharge Medication List      START taking these medications    Details   enoxaparin (LOVENOX) 40 MG/0.4ML syringe Inject 0.4 mLs (40 mg) Subcutaneous every 24 hours for 26 days  Qty: 11.2 Syringe, Refills: 0    Associated Diagnoses: Morbid obesity with BMI of 45.0-49.9, adult (H)      ondansetron (ZOFRAN-ODT) 4 MG ODT tab Take 1-2 tablets (4-8 mg) by mouth every 6 hours as needed for nausea or vomiting  Qty: 20 tablet, Refills: 0    Associated Diagnoses: Morbid obesity with BMI of 45.0-49.9, adult (H)      oxyCODONE (ROXICODONE) 5 MG tablet Take 1-2 tablets (5-10 mg) by mouth every 3 hours as needed for moderate to severe pain  Qty: 5 tablet, Refills: 0    Associated Diagnoses: Morbid obesity with BMI of 45.0-49.9, adult (H)      ranitidine (ZANTAC) 150 MG tablet Take 1 tablet (150 mg) by mouth 2 times daily  Qty: 60 tablet, Refills: 3    Associated Diagnoses: Morbid obesity with BMI of 45.0-49.9, adult (H)      scopolamine (TRANSDERM) 1 MG/3DAYS 72 hr patch Place 1 patch onto the skin every 72 hours as needed for nausea or vomiting  Qty: 1 patch, Refills: 1    Associated Diagnoses: Morbid obesity with BMI of 45.0-49.9, adult (H)         CONTINUE  these medications which have CHANGED    Details   acetaminophen (TYLENOL) 325 MG tablet Take 2 tablets (650 mg) by mouth every 4 hours as needed for pain  Qty: 40 tablet, Refills: 1    Associated Diagnoses: Morbid obesity with BMI of 45.0-49.9, adult (H)         CONTINUE these medications which have NOT CHANGED    Details   Calcium Carbonate (CALCIUM 600 PO) Take 600 mg by mouth 2 times daily (with meals) With Lunch and Dinner      childrens multivitamin w/iron (FLINTSTONES COMPLETE) 60 MG chewable tablet Take 1 chew tab by mouth daily      levonorgestrel (MIRENA, 52 MG,) 20 MCG/24HR IUD 1 each by Intrauterine route once      pravastatin (PRAVACHOL) 40 MG tablet Take 40 mg by mouth every evening       sertraline (ZOLOFT) 50 MG tablet Take 50 mg by mouth daily      vitamin D3 (CHOLECALCIFEROL) 2000 units tablet Take 2,000 Units by mouth daily (with dinner)      ZOLMitriptan (ZOMIG) 5 MG tablet Take 5 mg by mouth at onset of headache for migraine                 Allergies:         Allergies   Allergen Reactions     Avelox [Moxifloxacin]      Loss of taste     Cefzil [Cefprozil] Nausea and Vomiting     Doxycycline Hives             Brief History of Illness:   Laura Nava is a 53 year old-year-old female who underwent preoperative screening in anticipation for potential bariatric surgery. She was deemed an appropriate candidate for bariatric surgery by the consensus of our [place] [facility] team. In the office, surgical options were thoroughly discussed. She was furnished with a copy of our specialized consent form for bariatric surgery. The potential risks as outlined in that document were all thoroughly reviewed. All questions were answered and she wished to proceed. The patient was also seen by Dr. Zuniga of vascular medicine in consultation regarding possible hypercoagulable state. A plan was made for the patient to complete 28 days of anticoagulation for VTE prophylaxis postoperatively. Please see his  "consult note for full details.    On the day of surgery, after reviewing the risks, benefits, and possible complications, informed consent was obtained and the patient underwent the above procedure.  There were no complications.  Please see the Operative Report for full details.           Hospital Course:   Laura Nava's hospital course was unremarkable.  She recovered as anticipated and experienced no post-operative complications. Laura did have some mild mainly systolic hypertension during her hospital stay. This was asymptomatic and her other vitals were stable. This was discussed with Dr. Jaffe and we were in agreement that Laura is safe for discharge to home with her current blood pressure as this will likely continue to improve once she is home. This was discussed with the patient who was also in agreement. Laura also had some mild postoperative nausea which much improved by POD 2. She advanced her diet well as tolerated per protocol, was ambulating and voiding adequately, and had minimal pain controlled on oral medication.    On the date of discharge, the patient was discharged to home in stable condition and afebrile. She verbalized understanding of all discharge instructions and felt comfortable with the discharge plan.  She was asked to call with any further questions or concerns.         Condition on Discharge:      Discharge condition: Stable   Discharge vitals: Blood pressure 161/75, pulse 52, temperature 98.8  F (37.1  C), temperature source Oral, resp. rate 16, height 1.753 m (5' 9\"), weight 144.2 kg (318 lb), SpO2 93 %, not currently breastfeeding.           Discharge Disposition:   Discharged to home          Discharge Instructions and Follow-Up:      Laura Nava was asked to follow up with the bariatric surgical team within 1 week. Please see AVS for complete discharge instructions.    Vicki Bond PA-C  [facility]  [phone]       "

## 2019-03-01 NOTE — PLAN OF CARE
A&Ox4. VSS ex. HTN on RA. 5 lap sites, intact, dried drainage. abdominal binder in place. clear liquids . BS hypoactive, passing gas. scheduled tylenol used for pain. PRN oxy available. PRN compazine x1 for nausea, no emesis. Ambulating to BR with SBA, voiding w/o difficulty. Continue to Monitor.

## 2019-03-01 NOTE — PROGRESS NOTES
"General Surgery Progress Note    Admission Date: 2/27/2019  Today's Date: 3/1/2019         Assessment:      Laura Nava is a 53 year old female S/P lap gastric sleeve, POD #2   Morbid Obesity, BMI >45 with medical comorbidities including weight-bearing joint pain and high cholesterol         Plan:   Discharge to home today. Instructions reviewed, questions answered.   - Will monitor blood pressure and follow-up with PCP, discussed symptoms of hypertension to monitor for.   - Minimal pain, taking mainly tylenol. Will send with Tylenol, a few oxy, Zofran for nausea.   - Lovenox 40mg subQ every 24 hours x 26 more days  - Follow-up next week in weight loss clinic          Interval History:   Afebrile overnight, continued mild hypertension at times. Heart rate 50s-60s. No O2 requirement. Feeling much better today, moving more and nausea improved. Tolerating full liquids. Passing flatus. Pain minimal, only with movement. Taking only tylenol right now, prefers to avoid narcotics. Reviewed rationale for lovenox at discharge, pt understands plan and has given herself lovenox injections in the past while pregnant.          Physical Exam:   /75 (BP Location: Right arm)   Pulse 52   Temp 98.8  F (37.1  C) (Oral)   Resp 16   Ht 1.753 m (5' 9\")   Wt 144.2 kg (318 lb)   SpO2 93%   BMI 46.96 kg/m    I/O last 3 completed shifts:  In: 2726 [P.O.:570; I.V.:2156]  Out: 1150 [Urine:1150]  General: NAD, pleasant, alert and oriented x3  Cardiovascular: regular rate and rhythm; S1 and S2 distinct without murmur   Respiratory: lungs clear to auscultation bilaterally without wheezes, rales or rhonchi   Abdomen: soft, mild tenderness around incisions, nondistended  Incisions: clean, dry, and intact; minimal ecchymosis, no erythema or drainage  Extremities: no calf tenderness, no LE edema      ---------------------------------    Vicki Bond PA-C  [facility]: [phone]  Pager: [phone] " (7am-4pm)

## 2019-03-01 NOTE — PLAN OF CARE
Discharging to home with SO. Lovenox teaching completed, patient able to give injections to self-has had lovenox before.

## 2019-03-04 ENCOUNTER — TELEPHONE (OUTPATIENT)
Dept: SURGERY | Facility: CLINIC | Age: 54
End: 2019-03-04

## 2019-03-05 ENCOUNTER — OFFICE VISIT (OUTPATIENT)
Dept: SURGERY | Facility: CLINIC | Age: 54
End: 2019-03-05
Payer: COMMERCIAL

## 2019-03-05 VITALS
WEIGHT: 293 LBS | OXYGEN SATURATION: 97 % | RESPIRATION RATE: 16 BRPM | TEMPERATURE: 99.5 F | SYSTOLIC BLOOD PRESSURE: 132 MMHG | BODY MASS INDEX: 46.5 KG/M2 | HEART RATE: 58 BPM | DIASTOLIC BLOOD PRESSURE: 83 MMHG

## 2019-03-05 DIAGNOSIS — Z98.84 BARIATRIC SURGERY STATUS: ICD-10-CM

## 2019-03-05 DIAGNOSIS — K91.2 POSTSURGICAL MALABSORPTION: ICD-10-CM

## 2019-03-05 DIAGNOSIS — E66.01 MORBID OBESITY WITH BMI OF 45.0-49.9, ADULT (H): Primary | ICD-10-CM

## 2019-03-05 DIAGNOSIS — E66.01 MORBID OBESITY (H): Primary | ICD-10-CM

## 2019-03-05 PROCEDURE — 99024 POSTOP FOLLOW-UP VISIT: CPT | Performed by: PHYSICIAN ASSISTANT

## 2019-03-05 PROCEDURE — 99207 ZZC NO CHARGE NURSE ONLY: CPT

## 2019-03-05 NOTE — PATIENT INSTRUCTIONS
Continue with Lovenox as directed  Continue with recommended antacid medication for the next 3 months.   Strive to drink 64 oz of fluid daily.  Strive to move hourly while awake to decrease risk of developing a blood clot.  Continue to do deep breathing exercises twice daily or use your spirometer.  Follow up with your primary care provider to discuss obesity related conditions by one month post op.   Start recommended postoperative vitamins within in the first 6 weeks.  Advance diet per Dietitian at your 2 week appointment.   Make follow up appointment to meet with psychologist at 1 and 3 months post operatively.   Wear binder to support abdominal muscles and gradually wean off over the next few weeks.   Return to clinic for 2 wk post op appointment and bring post op vitamins along.  Call with questions or concerns at any time.

## 2019-03-05 NOTE — PROGRESS NOTES
[facility]   1 Week Surgical Follow-Up     PCP:  Vicki Ruiz    DOS: 02/27/19  Surgeon: CHERRIE  Surgery Type: Sleeve  HISTORY OF PRESENT ILLNESS:  Laura Nava returns today for her follow-up appointment status post bariatric surgery.  She is currently using Tylenol 1-2 times daily for pain medication.  Refill Needed: No.  Patient's Pain Scale: 0The pain is located right side abdomen.    Patient's current daily fluid intake in oz is: 48 ounces of water, Core Power drink 8 ounces.  Patient has started postoperative Vitamins: No.  Using Loxenox daily for one month  Mentally good.    Activity:   Activity: walking inside house up every hour  REVIEW OF SYSTEMS:  GI:    Nausea: No  Vomiting: No  Diarrhea: No  Constipation: No  Last BM: today - NL color and form  Dysphagia: No  GERD: Yes - taking zantac bid.  Last night had minimal heartburn because she drank before bed.    CV/Pulmonary:   Chest Pain: No  Dizzy: No  Light Headed: Yes  Notices when up and around  SOB: No  Uses IS gets up to 1500.    Endo:  Diabetes: No  :    Contraception: Mirena  Dysuria: No  Vascular:    LE Edema: No  PHYSICAL EXAMINATION:    /83 (BP Location: Left arm, Patient Position: Left side, Cuff Size: Thigh)   Pulse 58   Temp 99.5  F (37.5  C)   Resp 16   Wt 314 lb 14.4 oz (142.8 kg)   SpO2 97%   BMI 46.50 kg/m        GENERAL: No acute distress. Alert and oriented time 3.  HEART: Regular rate and rhythm.  LUNGS:  Clear to auscultation bilaterally.  ABDOMEN: Soft, incisions clean,dry, and intact. Tenderness WNL for post op.  EXTREMITIES: No lower extremity edema bilaterally. No calf swelling or tenderness. Negative vivien's  SKIN: No rashes.  PSYCHOLOGICAL: Stable. Pleasant    ASSESSMENT:    1. S/P bariatric surgery.  2. Post surgical malabsorption    PLAN:   Continue with Lovenox as directed  Continue with recommended antacid medication for the next 3 months.   Strive to drink 64 oz of fluid daily.  Strive  to move hourly while awake to decrease risk of developing a blood clot.  Continue to do deep breathing exercises twice daily or use your spirometer.  Follow up with your primary care provider to discuss obesity related conditions by one month post op.   Start recommended postoperative vitamins within in the first 6 weeks.  Advance diet per Dietitian at your 2 week appointment.   Make follow up appointment to meet with psychologist at 1 and 3 months post operatively.   Wear binder to support abdominal muscles and gradually wean off over the next few weeks.   Return to clinic for 2 wk post op appointment and bring post op vitamins along.  Call with questions or concerns at any time.

## 2019-03-08 ENCOUNTER — TELEPHONE (OUTPATIENT)
Dept: SURGERY | Facility: CLINIC | Age: 54
End: 2019-03-08

## 2019-03-08 DIAGNOSIS — R30.0 DYSURIA: ICD-10-CM

## 2019-03-08 DIAGNOSIS — Z98.84 BARIATRIC SURGERY STATUS: Primary | ICD-10-CM

## 2019-03-08 NOTE — TELEPHONE ENCOUNTER
Called patient back about patient concern.    Pt reports she was seen for checkup by primary MD yesterday; at that time was feeling lightheaded and dizzy so MD ordered CBC and Hgb, unknown if results are back yet.  States s/s improved with drinking gatorade.    Over the night patient reports increased urinary frequency and urgency.  States now today she is having frequency, urgency, and burning with urination.  States she had same s/s with UTI 1 month ago, treated with Bactrim.  States she has had intermittent temp of 99-99.5 since surgery.  Denies flank or low back pain.    Requests treatment for suspected UTI; preferred pharmacy [facility] in [place].    RN to consult with PA and call patient back.    Adalgisa Rosario RN on 3/8/2019 at 12:42 PM

## 2019-03-08 NOTE — TELEPHONE ENCOUNTER
Called patient back per PA instructions; told pt to go to any [place] location for UA test. Awaiting results at this time.      Adalgisa Rosario RN on 3/8/2019 at 1:11 PM

## 2019-03-08 NOTE — TELEPHONE ENCOUNTER
Reason for call:  Other   Patient called regarding (reason for call): Patient has a UTI and is wondering if [facility] can write her a prescription for medication because she is in pain. Had WLS surgery about a week ago.  Additional comments: Called nurse line and no one answered so told patient to leave     Phone number to reach patient:  Home number on file [phone] (home)    Best Time:  Any    Can we leave a detailed message on this number?  Did not get to ask before sending to

## 2019-03-08 NOTE — TELEPHONE ENCOUNTER
Called pt back regarding future order as clinic closes soon. Pt reports she went to primary MD which is closer to her to be seen for possible UTI.  States UA pending now.    Reports she is no longer dizzy or lightheaded, states gatorade/electrolytes have helped.  States she is getting 50+oz of fluid/day.  Denies any other needs today.  Adalgisa Rosario RN on 3/8/2019 at 3:17 PM

## 2019-03-13 ENCOUNTER — OFFICE VISIT (OUTPATIENT)
Dept: SURGERY | Facility: CLINIC | Age: 54
End: 2019-03-13
Payer: COMMERCIAL

## 2019-03-13 VITALS
SYSTOLIC BLOOD PRESSURE: 127 MMHG | DIASTOLIC BLOOD PRESSURE: 78 MMHG | OXYGEN SATURATION: 99 % | HEART RATE: 66 BPM | TEMPERATURE: 97.5 F | WEIGHT: 293 LBS | BODY MASS INDEX: 44.63 KG/M2 | RESPIRATION RATE: 20 BRPM

## 2019-03-13 VITALS — WEIGHT: 293 LBS | HEIGHT: 69 IN | BODY MASS INDEX: 43.4 KG/M2

## 2019-03-13 DIAGNOSIS — E66.01 MORBID OBESITY (H): ICD-10-CM

## 2019-03-13 DIAGNOSIS — Z98.84 BARIATRIC SURGERY STATUS: ICD-10-CM

## 2019-03-13 DIAGNOSIS — E86.0 DEHYDRATION: Primary | ICD-10-CM

## 2019-03-13 PROCEDURE — 99207 ZZC NO CHARGE NURSE ONLY: CPT

## 2019-03-13 PROCEDURE — 97803 MED NUTRITION INDIV SUBSEQ: CPT | Performed by: DIETITIAN, REGISTERED

## 2019-03-13 RX ORDER — MAGNESIUM 200 MG
1000 TABLET ORAL DAILY
COMMUNITY
End: 2022-01-03

## 2019-03-13 ASSESSMENT — MIFFLIN-ST. JEOR: SCORE: 2045.15

## 2019-03-13 ASSESSMENT — PAIN SCALES - GENERAL
PAINLEVEL: NO PAIN (0)
PAINLEVEL: NO PAIN (0)

## 2019-03-13 NOTE — PROGRESS NOTES
"BARIATRIC PROGRESS NOTE - 2 Week Post Op  DATE OF VISIT: March 13, 2019    Laura Nava  1965  female  [id number]  53 year old    ASSESSMENT:    REASON FOR VISIT:  Laura Nava is a 53 year old year old female presents today for a 2 Week Post Op nutrition follow-up appointment. Patient is accompanied by self      DIAGNOSIS:  Status: post gastric sleeve surgery.   Obesity Grade III BMI >40    ANTHROPOMETRICS:  Height: 175.3 cm (5' 9\")  Initial weight: 325.3 lb    Current Weight: 137.1 kg (302 lb 3.2 oz)  BMI: 44.63 kg/(m^2).    VITAMINS AND MINERALS:   2 Multivitamin with Minerals-morning  650 mg Calcium carbonate With Vitamin D BID-lunch and dinner  2000 International units Vitamin D  No Iron needed per clinic guidelines      NUTRITION HISTORY:  Tolerating diet: Bariatric full liquid diet  Fluids/water intake: 48 ounces/day (water, protein drink, decaf coffee, sugar free Gatorade)  Small bites: Yes  Portion size: 1/2 cup or less  20-30 minute meals: Yes  Breakfast: 1/2 cup or cream of what with milk to thin  Lunch: Greek yogurt  Dinner: 1/3 cup sugar free pudding  Snacks: Core Power protein drink, sugar free popsicles, broth     Nausea: just today in clinic  Vomiting: none  Constipation: denies  Additional Information: Patient not feeling well at her nurse visit in the clinic, but since drinking more fluids she is feeling better. Drank ~ 10 oz of fluid this morning. Stressed the importance of working toward 64 oz fluid per day. In general patient has been feeling weak, lightheaded and dizzy since having surgery. Suspect this may be due to fluid status and not starting the vitamin B-complex and vitamin B-12.Did not care for some of the foods on the bariatric full liquid diet. Returned to work yesterday for 1/2 day.      PHYSICAL ACTIVITY:  Type: walking in the house  Frequency: 7 days a week.  Duration: 10 minutes.    DIAGNOSIS:     Current Nutrition Diagnosis: Altered gastrointestinal function " related to alternation in gastrointestinal structure as evidenced by history of gastric sleeve.     INTERVENTION  Nutrition Prescription: Recommended bariatric puree diet.    Goals:  Start puree diet at day 14 post op.  Start vitamin B-12 and vitamin B complex  Continue MVI, calcium with vitamin D, vitamin D  Aim for 60 to 90 grams protein.  Start  64 oz of fluid per day.    Implementation:  Discussed transition to puree diet.  Emphasized importance of adequate protein.  Reviewed required vitamins and mineral supplements.  Verbalizes good understanding of surgery diet guidelines.  Assessed learning needs and learning preferences.      NUTRITION MONITORING AND EVALUATION:   Monitor diet tolerance and weight loss.      Anticipated Compliance: good  Follow Up: Continue to monitor patient closely regarding weight loss and diet.  At 4 weeks Post Op    TIME SPENT WITH PATIENT: 25 minutes.    Mandeep Blas RD, LD  [facility]  [phone]

## 2019-03-13 NOTE — PROGRESS NOTES
[facility]  2 Week Surgical Follow-Up     Current PCP:  Vicki Ruiz  HISTORY OF PRESENT ILLNESS:  Laura returns today for her follow-up appointment status post gastric sleeve  She  Is accompanied by self.     She is drinking 48 oz of fluid daily; states she struggles to get in that amount of fluid but has been forcing herself to drink.  Patient encouraged to increase her fluid intake as much as possible.  Pain:  She is currently using Tylenol PRN for pain medication. She rates her pain at a 3/10  on the pain scale. The pain is located to left side of abdomen near incisions.  States she uses tylenol infrequently, mostly at night.  Activity:  The patient is considered a fall risk: Yes. Interventions to secure the patient s safety are in place.  What are you doing for physical activity?  Walking at least every hour, short walks.   How many days per week?  7   How many minutes per day?  unsure of exact time  Review of Systems:  GI:  Nausea occurs never.          Vomiting occurs never.          Diarrhea occurs  never. Patient's last bowel movement was 3/13/19, normal in color.          Constipation occurs  never.     Heartburn occurs multiple times/day for the past 4-5 days.  Pt instructed per PA to increase Zantac to 300 mg BID.  RN to check effectiveness via phone with patient by the end of the week.           CV/Pulmonary: Dizziness occurs frequently; states she is often dizzy with standing.  Reports she has been having hot and cold flashes, and feeling weak and lacking energy.  States she does feel better after drinking her protein drink so she thinks this could be related to her nutrition.  Requested dietician to discuss energy strategies with patient.  Discussed that her hot and cold flashes could be related to her hormonal changes with rapid weight loss.  Patient agrees to notify clinic if having any worsening or ongoing problems with this.                                Shortness of Breath  "occurs never.    Chest pain occurs never.  Vascular:  Leg swelling none.                       Toro's Negative  :  Form of birth control is IUD  Reports she is still having occasional urgency which is improving; denies UTI found a MD appointment last week.  Reviewed records with patient from OR. Patient did have syed cath in OR.  Pt states her symptoms are \"80% gone\" and continue to improve. Agrees to call clinic with any worsening or if not completely improved in a week.  PHYSICAL EXAMINATION:    VITALS:  /62 (BP Location: Right arm, Patient Position: Sitting, Cuff Size: Adult Large)   Pulse 92   Temp 97.5  F (36.4  C) (Oral)   Resp 20   Wt 302 lb 3.2 oz (137.1 kg)   SpO2 97%   BMI 44.63 kg/m                    Cuff Size: x-large Left  LUNGS:  clear to auscultation  ABDOMEN: Abdomen soft, non-tender. BS normal. No masses, organomegaly   INCISION: dry and intact   MEDICATIONS/ALLERGIES REVIEWED: Yes  ASSESSMENT:    1.  2 weeks status post gastric sleeve surgery.  PLAN:    Make follow up appointment to meet with psychologist and 1 month post operatively.   -states she will do this.  Follow up with your primary care provider to obesity related conditions by one month post op.   -states already completed.  Advance diet per Dietitian at your 2 week appointment.   Start recommended postoperative vitamins within in the first 6 weeks per Dietitian  Strive to drink 64 oz of fluid daily.  -increased fluids encouraged by RN.  Wear binder to support abdominal muscles and gradually wean off over the next few weeks.   -states she has already stopped using this.  Continue to do deep breathing exercises twice daily or use your spirometer.   -states she is doing deep breathing a few times a day.  Return to clinic postop week 4.  Call with questions or concerns at any time.    INTERVENTIONS:      Steristrips removed; incisions clean, dry, intact without s/s of infection. Appear to be healing well. Discussed when " patient may return to bathing and swimming.    Exercise restrictions reviewed and handout given.  Pt reports limited exercise until now r/t dizziness and lack of energy.  Encouraged pt to listen to her body and pace herself with exercise to avoid overdoing.  Dietician to consult with patient about strategies to improve energy.    Encouraged patient to increase her fluid intake.      At the end of RN clinic appointment, just after removal of steristrips, patient became suddenly pale and dizzy. Assisted to sit down, then to go to bathroom per request. On the way to the bathroom, patient states she felt like she was going to pass out and needed to lie down. MARV London immediately notified that patient needed to be seen.  Patient assisted back to room 10 via wheelchair; RN staying with patient to monitor her.    1048:  Patient assisted to lie down in room 10.  Pulse 49 bpm on monitor. Patient pale, diaphoretic.  States she feels cold, is seeing spots and feeling nauseated, with urge to have BM.  Denies SOB, chest pain.  Denies pain or any other s/s.  PA in room now.    1050:  VS: HR 51 (apical pulse strong, regular), RR 22, manual BP 82/54, O2 sat 97% RA.  Patient remains weak and pale.  Pt reports she has only had 10 oz of fluid today.  RN and PA attending patient. Giving patient sips of water.    1054:  Pt pulse up to 77 bpm.  States she is feeling a little better, assisted to sit up.  RN sent to get beverage with calories for patient. Staff sent to another floor for this.    1056:  RN back to room.  Pt lying back down, states she feels worse again.  VS:  HR 54, RR 22, manual BP 88/52, O2 sat 97% RA.  Patient remains pale and weak.  RN remaining with patient to monitor.  Patient continues to deny CP, SOB.    1100:  Staff in room with apple juice. Pt taking sips of juice alternating with water.  RN continuing to monitor.  PA updated with consult from surgeon, told RN to continue to monitor.    1108:  Patient states  "she feels improved; no longer pale.  Sitting up per self.  Sipping more juice and water.  HR 72 bpm, RR 20, O2 sat 97% on RA.  States dizziness is subsiding.  RN remains in room.    1113:  Patient states she is much improved now.  Patient cheeks pink, no further diaphoresis.  States dizziness is almost gone. States she wishes to meet with dietician as scheduled now.  HR 65 bpm, RR 20, BP 97/64, O2 Sat 98% RA. Dietician to room now; requested dietician to discuss lack of energy and weakness with patient.    Consulted PA during diet appointment. Reviewed fluid status, noted 48-50 oz of fluid/day since surgery.  Noted patient had dizziness last week when seeing primary that improved with electrolyte drink.  Received order for IVF protocol x 1 dose.  [facility]  Informed there is no availability at all today at those locations.    1203:  Consulted with patient.  Patient states she feels well now.  HR 66, RR 20, /78, O2 sat 99% on RA.  Discussed IV recommendation with patient; offered to see if [facility] had opening near patient home.  Patient states \"I just want to go home and drink plenty of fluid today.\"  Pt informed she needs to increase her fluid intake.  Pt reassured that order was in place for IV fluids if she decided she needed this.  Patient demonstrated ability to walk with steady gait, denies any dizziness standing or walking now.  States she feels safe to drive herself home.  Agrees to call clinic with any further concerns or symptoms.  RN to follow up by phone on Friday.    Adalgisa Rosario RN on 3/13/2019 at 1:18 PM        "

## 2019-03-15 ENCOUNTER — TELEPHONE (OUTPATIENT)
Dept: SURGERY | Facility: CLINIC | Age: 54
End: 2019-03-15

## 2019-03-15 DIAGNOSIS — K21.9 GASTROESOPHAGEAL REFLUX DISEASE WITHOUT ESOPHAGITIS: Primary | ICD-10-CM

## 2019-03-15 RX ORDER — OMEPRAZOLE 40 MG/1
40 CAPSULE, DELAYED RELEASE ORAL DAILY
Qty: 30 CAPSULE | Refills: 2 | Status: SHIPPED | OUTPATIENT
Start: 2019-03-15 | End: 2020-02-28

## 2019-03-15 NOTE — TELEPHONE ENCOUNTER
Called pt back about her s/s.  Per PA pt to switch to omprazole 40 mg every day, stop zantac for now.  RN to recheck s/s on Monday with call.    Updated patient on plan as above.  Encouraged patient to continue working on her fluids.  Pt agrees to call on-call triage line with any concerns during the weekend.    Adalgisa Rosario RN on 3/15/2019 at 4:10 PM

## 2019-03-15 NOTE — TELEPHONE ENCOUNTER
Will order 40 mg of omeprazole once daily 2 refills.  Recheck symptoms at 3 mo PO.  Can discontinue ranitidine but keep on hand.  May go back to a later time.    GALLO

## 2019-03-15 NOTE — TELEPHONE ENCOUNTER
"Pt called and left message; reports that despite increasing her Zantac to 300 mg BID she still has \"waves of heartburn\" 5-6x/day.  Requests more treatment for this.    Will consult PA and call pt back.  Adalgisa Rosario RN on 3/15/2019 at 3:56 PM    "

## 2019-03-15 NOTE — TELEPHONE ENCOUNTER
Pt called back with update.  States she has not had any more severe dizziness like in clinic, has not passed out or felt like she might.      States she took the rest of the week off work to rest; states she has just been focusing all her time on increasing protein and fluids.  States she is constantly sipping in order to get her fluids in.    States she feels weak and low on energy and somewhat lightheaded although nothing like in clinic.  Offered patient IV fluid course that is ordered.  Pt declines, states she would prefer to rest at home and drink her fluids now that she is getting more in.  States she got 53 oz of fluid in yesterday.    States she is worried about how long she will feel tired and weak.  Encouraged patient to keep drinking and getting in protein and getting plenty of rest.  Discussed with patient that she appears to have several days that she was near low on fluids so it takes a little more time to catch up.    Pt agrees to continue with fluids and rest throughout the weekend. RN to check in on progress on Monday.    Reminded pt to go to ED if she has any chest pain, SOB, or syncope. Pt agrees to do this.    Reminded pt about after hours call line for concerns over the weekend.  Adalgisa Rosario, RN on 3/15/2019 at 12:04 PM

## 2019-03-18 ENCOUNTER — TELEPHONE (OUTPATIENT)
Dept: SURGERY | Facility: CLINIC | Age: 54
End: 2019-03-18

## 2019-03-18 NOTE — TELEPHONE ENCOUNTER
"Called pt to check on symptoms.     Pt reports she is \"doing ok\"; states she is still weak but is allowing herself to rest as needed.      States she is still \"stuck at 48 oz of fluid/day\" but is still working on increasing this and is able to take a few more swallows at a time.    States no more severe dizziness episodes, still feels \"a little weak and lightheaded\" but s/s are slowly improving.    States omeprazole \"is helping tons\" and has had no more heartburn episodes since starting this.    States she does not need any more FMLA, states she is the  and can do what she needs re: work to allow herself time to recover.    Agrees to call clinic if she does not continue to improve over the next week.    Adalgisa Rosario RN on 3/18/2019 at 10:04 AM    "

## 2019-04-01 ENCOUNTER — OFFICE VISIT (OUTPATIENT)
Dept: SURGERY | Facility: CLINIC | Age: 54
End: 2019-04-01
Payer: COMMERCIAL

## 2019-04-01 VITALS — WEIGHT: 293 LBS | BODY MASS INDEX: 43.4 KG/M2 | HEIGHT: 69 IN

## 2019-04-01 DIAGNOSIS — E66.01 MORBID OBESITY (H): ICD-10-CM

## 2019-04-01 DIAGNOSIS — Z98.84 BARIATRIC SURGERY STATUS: ICD-10-CM

## 2019-04-01 PROCEDURE — 97803 MED NUTRITION INDIV SUBSEQ: CPT | Performed by: DIETITIAN, REGISTERED

## 2019-04-01 ASSESSMENT — MIFFLIN-ST. JEOR: SCORE: 2008.86

## 2019-04-01 NOTE — PROGRESS NOTES
"BARIATRIC PROGRESS NOTE - 4 Week Post Op  DATE OF VISIT: 2019    Laura Nava  1965  female  [id number]  53 year old    ASSESSMENT:    REASON FOR VISIT:  Laura Nava is a 53 year old year old female presents today for a 4 Week Post Op nutrition follow-up appointment. Patient is accompanied by self      DIAGNOSIS:  Status: post gastric sleeve surgery.   Obesity Grade III BMI >40    ANTHROPOMETRICS:  Height: 175.3 cm (5' 9\")  Initial weight: 325.3 lb    Current Weight: 133.4 kg (294 lb 3.2 oz)  BMI: 43.45 kg/(m^2).    VITAMINS AND MINERALS:   2 Multivitamin with Minerals- takes 2 hour away from calcium  500 mg Calcium With Vitamin D TID  2000 International units Vitamin D  1000 mcg Vitamin B-12 sublingual  1 vitamin B-complex  No Iron needed per clinic guidelines      NUTRITION HISTORY:  Tolerating diet: Bariatric pureed diet  Fluids/water intake: 40 ounces/day  Small bites: Yes  Portion size: 1/2 cup  20-30 minute meals: Yes  Fluids and meals  by 30 minutes: Yes  Chew foods thoroughly: Yes (tried mashed up chicken)  Breakfast: protein drink or has tried soggy 1/4 cup Cheerios + 1/4 cup Fairlife milk  Lunch: Greek yogurt  Dinner: 1/4 cup mashed up meat 1/4 cup mashed potatoes  Snacks: sugar free popsicle x some times second protein drink  Nausea: occasional  VomitinX (on tylenol)  Constipation: denies  Additional Information: Patient feels light headed and weak most of the day. Suspect this is due to not getting enough fluid in.      PHYSICAL ACTIVITY:  Type: walking  Frequency: 7 days a week.  Duration: 10 minutes.    DIAGNOSIS:   *Previous Nutrition Diagnosis: Altered gastrointestinal function related to alternation in gastrointestinal structure as evidenced by history of gastric sleeve.   No change    Previous Goals:  Start puree diet at day 14 post op.-met  Start vitamin B-12 and vitamin B complex-met  Continue MVI, calcium with vitamin D, vitamin D-met  Aim for 60 to " 90 grams protein.-met  Start  64 oz of fluid per day.-not met        Current Nutrition Diagnosis: Altered gastrointestinal function related to alternation in gastrointestinal structure as evidenced by history of gastric sleeve.     INTERVENTION  Nutrition Prescription: Recommended bariatric puree diet.    Goals:  Start puree diet at day 28 post op.  Continue MVI, calcium with vitamin D, vitamin B12, vitamin D, and vitamin B-complex  Aim for 60 to 90 grams protein.  Increase to 64 oz of fluid per day.    Implementation:  Discussed transition to soft diet.  Emphasized importance of adequate protein.  Reviewed required vitamins and mineral supplements.  Verbalizes good understanding of surgery diet guidelines.  Assessed learning needs and learning preferences.      NUTRITION MONITORING AND EVALUATION:   Monitor diet tolerance and weight loss.      Anticipated Compliance: fair-good  Follow Up: Continue to monitor patient closely regarding weight loss and diet.  At 3 months Post Op    TIME SPENT WITH PATIENT: 20 minutes.    Mandeep Blas RD, LD  [facility]  [phone]

## 2019-06-04 ENCOUNTER — OFFICE VISIT (OUTPATIENT)
Dept: SURGERY | Facility: CLINIC | Age: 54
End: 2019-06-04
Payer: COMMERCIAL

## 2019-06-04 VITALS — WEIGHT: 273.8 LBS | HEIGHT: 69 IN | BODY MASS INDEX: 40.55 KG/M2

## 2019-06-04 VITALS
HEIGHT: 69 IN | WEIGHT: 273.8 LBS | SYSTOLIC BLOOD PRESSURE: 127 MMHG | BODY MASS INDEX: 40.55 KG/M2 | OXYGEN SATURATION: 99 % | HEART RATE: 60 BPM | DIASTOLIC BLOOD PRESSURE: 70 MMHG

## 2019-06-04 DIAGNOSIS — K91.2 POSTSURGICAL MALABSORPTION: ICD-10-CM

## 2019-06-04 DIAGNOSIS — E66.01 MORBID OBESITY (H): ICD-10-CM

## 2019-06-04 DIAGNOSIS — K21.9 GERD WITHOUT ESOPHAGITIS: ICD-10-CM

## 2019-06-04 DIAGNOSIS — L65.0 TELOGEN EFFLUVIUM: ICD-10-CM

## 2019-06-04 DIAGNOSIS — Z98.84 BARIATRIC SURGERY STATUS: ICD-10-CM

## 2019-06-04 DIAGNOSIS — Z86.39 HISTORY OF HIGH CHOLESTEROL: ICD-10-CM

## 2019-06-04 DIAGNOSIS — E66.01 MORBID OBESITY WITH BMI OF 45.0-49.9, ADULT (H): ICD-10-CM

## 2019-06-04 DIAGNOSIS — E66.01 MORBID OBESITY WITH BMI OF 40.0-44.9, ADULT (H): Primary | ICD-10-CM

## 2019-06-04 PROCEDURE — 99214 OFFICE O/P EST MOD 30 MIN: CPT | Performed by: PHYSICIAN ASSISTANT

## 2019-06-04 PROCEDURE — 97803 MED NUTRITION INDIV SUBSEQ: CPT | Performed by: DIETITIAN, REGISTERED

## 2019-06-04 ASSESSMENT — MIFFLIN-ST. JEOR
SCORE: 1916.33
SCORE: 1916.33

## 2019-06-04 NOTE — PROGRESS NOTES
"NUTRITION POST OP APPOINTMENT  DATE OF VISIT: June 4, 2019    Laura Nava  1965  female  [id number]  53 year old     ASSESSMENT:    REASON FOR VISIT:  Laura is a 53 year old year old female presents today for 3 month PO nutrition follow-up appointment. Patient is accompanied by self.    DIAGNOSIS:  Status post gastric sleeve surgery.  Obesity Obesity Grade III BMI >40     ANTHROPOMETRICS:  Initial Weight: 325.3 lbs    Height: 175.3 cm (5' 9\")  Current Weight: 124.2 kg (273 lb 12.8 oz)   BMI: 40.43 kg/(m^2).    VITAMINS AND MINERALS:  2 Multivitamin with Minerals  650 mg Calcium With Vitamin D BID  2000 International units Vitamin D  1000 mcg Vitamin B-12 sublingual  1 Vitamin B Complex  No iron needed per clinic guidelines    NUTRITION HISTORY:  Breakfast: [9am] Corepower shake (26g protein, 170kcals)   Lunch: [1pm] Dannon Light n Fit Nonfat Greek Yogurt  Supper: [6pm] meat - pork, hamburger, minced chicken (no sides)  Snacks: evening - Halo Top or Skinny Cow ice cream, SF popsicles   Fluids consumed: Water (40oz), SF Sobe Lifewater (20oz)  Consuming liquid calories: Yes  Protein intake: 60-70 grams/day  Tolerate regular texture food: Yes  Any foods not tolerated details: Yes  If any food not tolerated: dry chicken, bread  Portion size: 1/2c  Take 20-30  minutes to consume each meal: Yes   Eat protein foods first: Yes  Fluids and meals separate by at least 30 minutes: Yes  Chew foods thoroughly: Yes  Tolerating diet: Yes  Drinking high protein supplements: Yes  Consuming snacks per day: evenings  Additional Information: Pt only any able to tolerate 1/2c portions. Very minimal CHO intake. Struggling with low energy levels and some evidence of ketosis (fatigue, breath changes). Recommended 600-800kcals and increase CHO intake.       PHYSICAL ACTIVITY:  Type: walking   Frequency (days per week): 7  Duration (min): 15    DIAGNOSIS:  Previous Nutrition Diagnosis: Altered gastrointestinal function related " to alteration in gastrointestinal structure as evidenced by history of gastric sleeve surgery.- no change    Previous goals:  Start puree diet at day 28 post op. - met  Continue MVI, calcium with vitamin D, vitamin B12, vitamin D, and vitamin B-complex - met  Aim for 60 to 90 grams protein. -met  Increase to 64 oz of fluid per day. -met      Current Nutrition Diagnosis: Altered gastrointestinal function related to alteration in gastrointestinal structure as evidenced by history of gastric sleeve surgery.    INTERVENTION:   Nutrition Prescription: Eat 3 meals a day at regular intervals. Consume 60-90 grams of protein daily. Follow post-surgical vitamin and mineral protocol.  Assessed learning needs and learning preferences.    GOALS:  Increase vitamin D to 5000 international units  Have at least two food groups per meal, gradually building to three food groups  **If unable to increase beyond 1/2c over the next month, pt instructed to call clinic; may need to temporarily do small, frequent meals to better facilitate CHO intake and improve energy levels    Follow-Up:   Recommend standard post-op follow up visits to assist with lifestyle changes or per insurance.  Implementation: Discussed progress toward previous goals; reinforced importance of following bariatric lifestyle changes.    NUTRITION MONITORING AND EVALUATION:  Anticipated compliance: good  Verbalized good understanding.    Follow up: Patient to follow up in 3 months, at 6 months post-op    TIME SPENT WITH PATIENT:  30 minutes    Xiomy Alva RD, LD  Clinical Dietitian

## 2019-06-04 NOTE — LETTER
[facility]          [address]  [address]  [place] [address]                                         Tel:  [phone]  Fax: [phone]    2019    Laura Nava  [address]  [place] [address]    : 1965      Dear Laura;     We have identified that you have outstanding lab tests that have . If you would like to have the  labs completed, please contact our clinic at [phone] to have them re-ordered. If you have any questions, please contact our office.  Sincerely,    Ritika ROMAN MA

## 2019-06-04 NOTE — PATIENT INSTRUCTIONS
3 months status laparoscopic gastric sleeve  Morbid Obesity - Improved Body mass index is 40.43 kg/m ..  Post surgical malabsorption:   Ordered vitamin B12, vitamin D, PTH, ferritin, TIBC, and iron labs.   Follow food plan per dietitian recommendations.   Continue taking recommended post-op vitamins.  GERD without esophagitis -  Will have restart zantac 300 mg bid.  Patient to call clinic in the next couple of days with update.    Increase cardio to promote weight loss  Telogen effluvium - discussed result of surgery  Return to clinic in 3 months

## 2019-06-04 NOTE — PROGRESS NOTES
"BARIATRIC FOLLOW UP VISIT       June 4, 2019       HISTORY OF PRESENT ILLNESS: Pt returns today for her follow-up appointment status post laparoscopic gastric sleeve.  Overall feeling better.  Energy is improving from the first couple of months post surgery.  Patient was taking omeprazole 40 mg up until last week when she ran out of her 3 month supply.  Noticed heartburn symptoms returning about 3 days later.  Taking tums occasionally.       Initial Weight: 325 lb 4.8 oz (147.6 kg)   Current Weight: 273 lb 12.8 oz (124.2 kg)  Cumulative weight loss (lbs): 51.5  Last Visits Weight: 294 lb 3.2 oz (133.4 kg)     Patient is taking the following bariatric postoperative vitamins:  2 Complete multivitamins with minerals (at different times than calcium)   2000 International Units of Vitamin D daily  1300 mg of Calcium daily in divided doses  1000 mcg of Vitamin B12 sl daily  No iron per protocol    Pt is exercising by walking 15 minutes everyday.        OBESITY RELATED CONDITIONS:  High cholesterol - Has not been checked  Joint pain - knees and ankle - much improved  GERD without esophagitis - present  Migraines - much improved      SOCIAL HISTORY:  Pt denies smoking.  Pt denies alcohol use.  Avoids NSAIDS.        REVIEW OF SYSTEMS:     GI:  Nausea- seldom  Vomiting- No  Diarrhea- No  Constipation- No    Gets in 42-45 oz of water with vitamin water  Dysphagia- No  Abdominal Pain- No  Heartburn- Yes     SKIN:  Intertriginous irritation- No  Hair loss - starting        PSYCH:  Depression- No  Anxiety- No      LABS/IMAGING/MEDICAL RECORDS REVIEW: recent labs    PHYSICAL EXAMINATION:   Pulse 60   Ht 5' 9\" (1.753 m)   Wt 273 lb 12.8 oz (124.2 kg)   SpO2 99%   BMI 40.43 kg/m      GENERAL: Alert and oriented x3. NAD  HEART: No murmurs, rubs or gallops, Regular rate and rhythm  LUNGS: Breathing unlabored, Lung sounds clear to auscultation bilaterally  ABDOMEN: soft; nontender; nondistended, incision well healed. No " hernia  EXTREMITIES: No LE edema bilaterally, Gait normal  SKIN: No intertriginous irritation or rash      ASSESSMENT AND PLAN:      3 months status laparoscopic gastric sleeve  Morbid Obesity - Improved Body mass index is 40.43 kg/m ..  Post surgical malabsorption:   Ordered vitamin B12, vitamin D, PTH, ferritin, TIBC, and iron labs.   Follow food plan per dietitian recommendations.   Continue taking recommended post-op vitamins.  GERD without esophagitis -  Will have restart zantac 300 mg bid.  Patient to call clinic in the next couple of days with update.  Can renew zantac if working otherwise will send RX for omeprazole 20 mg to pharmacy  Increase cardio to promote weight loss  Telogen effluvium - discussed result of surgery  Return to clinic in 3 months      I spent a total of 25 minutes face to face with Laura during today's office visit. Over 50% of this time was spent counseling the patient and/or coordinating care.

## 2019-06-10 ENCOUNTER — TELEPHONE (OUTPATIENT)
Dept: SURGERY | Facility: CLINIC | Age: 54
End: 2019-06-10

## 2019-06-10 DIAGNOSIS — Z98.84 BARIATRIC SURGERY STATUS: Primary | ICD-10-CM

## 2019-06-10 DIAGNOSIS — K21.9 GERD WITHOUT ESOPHAGITIS: ICD-10-CM

## 2019-06-10 NOTE — TELEPHONE ENCOUNTER
Patient I 3 mo. S/P gastric sleeve.  Patient called today and stated that she saw MARV Crews on 6/4/19 for her 3 mo PO visit.    Reports that prior to the visit she was told to stop Prilosec/Omeprazole the week before her visit although this writer can't see this in her EMR. I'm seeing that she ran out of Prilosec.    At 6/4/19 visit was told to change to Zantac 300 mg twice daily and update clinic with how she is doing in the next couple of days.    Patient states that she is taking the Zantac 300 mg twice daily since 6/4/19 and having GERD sx once a day to once every other day.  The sx occur close to the time she is in need of her next dose.   The sx are reported as mild to moderate and reports she is comfortable but is worried that the sx may cause an ulcer.     Patient would like to know if she should continue with Zantac 300 mg twice daily or switch back to the Prilosec/Omeprazole  40 mg that she has at home.    If should stay on the Zantac will need another refill.  Her pharmacy is [facility] in [place].  Has plenty or Prilosec/Omeprazole  40 mg at home.  However MARV Crews wrote to RX for Omeprazole 20 mg daily.    Informed patient would d/w MARV Mcdermott and get back to her before the end of the day.  Patient stated okay to LM on her cell if NA.  Naa Gonsales, MS, RD, RN

## 2019-06-10 NOTE — TELEPHONE ENCOUNTER
Called patient and informed her that she should take her Omeprazole tonight and tomorrow and our clinic will get back to her tomorrow re: MARV Crews's decision re: Zantac vs. Omeprazole.  Patient verbalized understanding and is agreeable to plan.  Naa Gonsales, MS, RD, RN

## 2019-06-10 NOTE — TELEPHONE ENCOUNTER
Eleonora is here tomorrow.  I have never seen Laura.  I think my decision and Eleonora's decision might differ so I am going to defer to Eleonora tomorrow and she and I can talk about it.  In the mean time, I would stay the patient can go back to taking the Omeprazole she has on hand if she does not have enough tonight and tomorrow.      GALLO

## 2019-06-11 NOTE — TELEPHONE ENCOUNTER
Spoke with MARV Crews.  Okay for patient to change to Omeprazole 20 mg daily - can always increase to 40 mg daily if needed.  Order to be placed by provider.    Patient called to notify re: above.  LM for patient to return call.  When patient returns, please inform her re: above.  Naa Gonsales, MS, RD, RN

## 2019-06-12 NOTE — TELEPHONE ENCOUNTER
Noted that patient stated Okay to LM on cell on 6/10/19.  LM that okay to change to Omeprazole 20 mg daily and try lower dose.  If experiencing breakthrough GERD can go back to 40 mg Omeprazole daily.    Order placed for Omeprazole 20 mg daily with 2 RF and pended for provider.  Naa Gonsales, MS, RD, RN

## 2019-07-09 ENCOUNTER — TRANSFERRED RECORDS (OUTPATIENT)
Dept: HEALTH INFORMATION MANAGEMENT | Facility: CLINIC | Age: 54
End: 2019-07-09

## 2019-07-09 LAB
CHOLEST SERPL-MCNC: 134 MG/DL
CREAT SERPL-MCNC: 0.7 MG/DL (ref 0.5–1.2)
GFR SERPL CREATININE-BSD FRML MDRD: 99 ML/MIN/1.73M2
GLUCOSE SERPL-MCNC: 81 MG/DL (ref 65–99)
HDLC SERPL-MCNC: 46 MG/DL (ref 40–60)
LDLC SERPL CALC-MCNC: 72 MG/DL
POTASSIUM SERPL-SCNC: 4.2 MMOL/L (ref 3.5–5)
TRIGL SERPL-MCNC: 79 MG/DL (ref 35–160)

## 2019-08-29 ENCOUNTER — OFFICE VISIT (OUTPATIENT)
Dept: SURGERY | Facility: CLINIC | Age: 54
End: 2019-08-29
Payer: COMMERCIAL

## 2019-08-29 VITALS
BODY MASS INDEX: 37.71 KG/M2 | WEIGHT: 254.6 LBS | HEIGHT: 69 IN | HEART RATE: 51 BPM | OXYGEN SATURATION: 98 % | SYSTOLIC BLOOD PRESSURE: 133 MMHG | DIASTOLIC BLOOD PRESSURE: 68 MMHG

## 2019-08-29 DIAGNOSIS — K21.9 GERD WITHOUT ESOPHAGITIS: ICD-10-CM

## 2019-08-29 DIAGNOSIS — Z86.39 HISTORY OF HIGH CHOLESTEROL: ICD-10-CM

## 2019-08-29 DIAGNOSIS — K91.2 POSTSURGICAL MALABSORPTION: ICD-10-CM

## 2019-08-29 DIAGNOSIS — Z98.84 BARIATRIC SURGERY STATUS: Primary | ICD-10-CM

## 2019-08-29 DIAGNOSIS — E66.01 CLASS 2 SEVERE OBESITY DUE TO EXCESS CALORIES WITH SERIOUS COMORBIDITY AND BODY MASS INDEX (BMI) OF 37.0 TO 37.9 IN ADULT (H): ICD-10-CM

## 2019-08-29 DIAGNOSIS — E66.812 CLASS 2 SEVERE OBESITY DUE TO EXCESS CALORIES WITH SERIOUS COMORBIDITY AND BODY MASS INDEX (BMI) OF 37.0 TO 37.9 IN ADULT (H): ICD-10-CM

## 2019-08-29 DIAGNOSIS — Z98.84 BARIATRIC SURGERY STATUS: ICD-10-CM

## 2019-08-29 PROCEDURE — 99213 OFFICE O/P EST LOW 20 MIN: CPT | Performed by: PHYSICIAN ASSISTANT

## 2019-08-29 PROCEDURE — 97803 MED NUTRITION INDIV SUBSEQ: CPT | Performed by: DIETITIAN, REGISTERED

## 2019-08-29 ASSESSMENT — MIFFLIN-ST. JEOR: SCORE: 1829.24

## 2019-08-29 NOTE — PATIENT INSTRUCTIONS
6 months status laparoscopic gastric sleeve  Morbid Obesity - Resolved  Obesity - Body mass index is 37.6 kg/m ..  Post surgical malabsorption:   Ordered CBC, vitamin B12, vitamin D, PTH, ferritin, TIBC, and iron labs.- Print out given to patient  and she will get done at primary's office   Follow food plan per dietitian recommendations.   Continue taking recommended post-op vitamins.  GERD without esophagitis - will restart zantac 300 bid.  If does not relieve restart omeprazole 20 mg.  Let clinic know if not better or worse  Hyperlipidemia - continue seeing primary  Discussed while taking pravastatin hold off on B Complex.  Instead take a separate thiamine supplement  Return to clinic in 6 months

## 2019-08-29 NOTE — PROGRESS NOTES
"BARIATRIC FOLLOW UP VISIT       August 29, 2019       HISTORY OF PRESENT ILLNESS: Pt returns today for her follow-up appointment status post laparoscopic gastric sleeve.  Still dealing with heartburn.  Tried zantac 300 mg bid.  She was then changed to omeprazole 20 mg daily.  Stopped 2 weeks ago so see how she would do.  Has had mild heartburn daily.      Initial Weight: 325 lb 4.8 oz (147.6 kg)   Current Weight: 254 lb 9.6 oz (115.5 kg)  Cumulative weight loss (lbs): 70.7  Last Visits Weight: 273 lb 12.8 oz (124.2 kg)     Patient is taking the following bariatric postoperative vitamins:  2 Complete multivitamins with minerals (at different times than calcium)   2000 International Units of Vitamin D daily  1300 mg of Calcium daily in divided doses  1000 mcg of Vitamin B12 sl daily  1 B Complex    Pt is exercising by walking 30 minutes daily       OBESITY RELATED CONDITIONS:  High cholesterol - decreased med in half  Joint pain - knees and ankle - much improved  GERD without esophagitis - present  Migraines - much improved        SOCIAL HISTORY:  Pt denies smoking.  Pt denies alcohol use.  Avoids NSAIDS.  No caffeine    REVIEW OF SYSTEMS:     GI:  Nausea- yes in the AM   Vomiting- No  Diarrhea- No  Constipation- No. Water intake 40-50 oz   Dysphagia- No  Abdominal Pain- No  Heartburn- yes see above     SKIN:  Intertriginous irritation- No       PSYCH:  Depression- No  Anxiety- No      LABS/IMAGING/MEDICAL RECORDS REVIEW:  Last set of labs    PHYSICAL EXAMINATION:   /68 (BP Location: Right arm, Patient Position: Sitting, Cuff Size: Adult Large)   Pulse 51   Ht 5' 9\" (1.753 m)   Wt 254 lb 9.6 oz (115.5 kg)   SpO2 98%   BMI 37.60 kg/m      GENERAL: Alert and oriented x3. NAD  HEART: No murmurs, rubs or gallops, Regular rate and rhythm  LUNGS: Breathing unlabored, Lung sounds clear to auscultation bilaterally  ABDOMEN: soft; nontender; nondistended, incision well healed. No hernia  EXTREMITIES: No LE edema " bilaterally, Gait normal  SKIN: No intertriginous irritation or rash      ASSESSMENT AND PLAN:      6 months status laparoscopic gastric sleeve  Morbid Obesity - Resolved  Obesity - Body mass index is 37.6 kg/m ..  Post surgical malabsorption:   Ordered CBC, vitamin B12, vitamin D, PTH, ferritin, TIBC, and iron labs.- Print out given to patient and she will  get done at primary's office   Follow food plan per dietitian recommendations.   Continue taking recommended post-op vitamins.  GERD without esophagitis - will restart zantac 300 bid.  If does not relieve restart omeprazole 20 mg. Let clinic know if not better or worse  Hyperlipidemia - continue seeing primary  Discussed while taking pravastatin hold off on B Complex.  Instead take a separate thiamine supplement  Return to clinic in 6 months      I spent a total of 17 minutes face to face with Laura during today's office visit. Over 50% of this time was spent counseling the patient and/or coordinating care.

## 2019-08-29 NOTE — PROGRESS NOTES
"NUTRITION POST OP APPOINTMENT  DATE OF VISIT: August 29, 2019    Laura Nava  1965  female  [id number]  53 year old     ASSESSMENT:    REASON FOR VISIT:  Laura is a 53 year old year old female presents today for 6 month PO nutrition follow-up appointment. Patient is accompanied by self.    DIAGNOSIS:  Status post gastric sleeve surgery.  Obesity Obesity Grade II BMI 35-39.9     ANTHROPOMETRICS:  Initial Weight: 325.3 lbs   Height: 5'9\"  Current Weight: 254 lbs  BMI:37.6 kg/(m^2).    VITAMINS AND MINERALS:  2 Multivitamin with Minerals  650 mg Calcium With Vitamin D BID  2000 International units Vitamin D  1000 mcg Vitamin B-12 sublingual  1 Vitamin B Complex  No iron needed per clinic guidelines    NUTRITION HISTORY:  Breakfast: Core Power shake (25 grams protein)  Snack: Banana or yogurt  Lunch: Light and Fit yogurt (12 grams protein)   Snack: Nuts  Supper: 3-4 oz meat +starch + fruit and vegetables (1 cup)  Snacks: small ice cream (Halo Top)  Fluids consumed: Water and Protein Drink  Consuming liquid calories: Yes  Protein intake: 60+ grams/day  Tolerate regular texture food: Yes  Any foods not tolerated details: Yes  If any food not tolerated: chicken and bread   Portion size: 1/2-1 cup  Take 20-30 minutes to consume each meal: Yes   Eat protein foods first: Yes  Fluids and meals separate by at least 30 minutes: Yes  Chew foods thoroughly: Yes  Tolerating diet: Yes  Drinking high protein supplements: Yes  Consuming snacks per day: 2-3  Additional Information: Patient pleased with weight loss.  Patient snacking multiple times per day. Discussed old food behaviors and need to stay focused on post surgery diet guidelines.      PHYSICAL ACTIVITY:  Type: walking at lunch   Frequency (days per week): 5  Duration (min): 30     DIAGNOSIS:  Previous Nutrition Diagnosis: Altered gastrointestinal function related to alteration in gastrointestinal structure as evidenced by history of gastric sleeve surgery.- " no change    Previous goals:  Increase vitamin D to 5000 international units-not met   Have at least two food groups per meal, gradually building to three food groups-improving   If unable to increase beyond 1/2c over the next month, pt instructed to call clinic; may need to temporarily do small, frequent meals to better facilitate CHO intake and improve energy levels-not met      Current Nutrition Diagnosis: Altered gastrointestinal function related to alteration in gastrointestinal structure as evidenced by history of gastric sleeve surgery.    INTERVENTION:   Nutrition Prescription: Eat 3 meals a day at regular intervals. Consume 60-90 grams of protein daily. Follow post-surgical vitamin and mineral protocol.  Assessed learning needs and learning preferences.    GOALS:  Change snacks to milk vs solid food  Eat 3 food groups per meal  Continue following post surgery diet guidelines     Implementation: Discussed progress toward previous goals; reinforced importance of following bariatric lifestyle changes.    NUTRITION MONITORING AND EVALUATION:  Anticipated compliance: good  Verbalized good understanding.    Follow up: Patient to follow up in 6 months for 1 year follow up appointment.    TIME SPENT WITH PATIENT:  25 minutes    Priyanka Fleming, RD, LD  [facility] Outpatient Dietitian  [phone] (office phone)

## 2019-08-29 NOTE — LETTER
[facility]          [address]  [address]  [place] [address]                                         Tel:  [phone]  Fax: [phone]    2020    Laura Nava  [address]  [place] [address]      Dear Laura;     We have identified that you have outstanding lab tests that have . If you would like to have the  labs completed, please contact our clinic at [phone] to have them re-ordered. If you have any questions, please contact our office.  Sincerely,    Ritika ROMAN MA

## 2019-09-05 ENCOUNTER — TELEPHONE (OUTPATIENT)
Dept: SURGERY | Facility: CLINIC | Age: 54
End: 2019-09-05

## 2019-09-05 NOTE — TELEPHONE ENCOUNTER
Left VM for patient to call clinic about need for omeprazole as a request came through      Eleonora London MS, PA-C

## 2019-11-08 ENCOUNTER — HEALTH MAINTENANCE LETTER (OUTPATIENT)
Age: 54
End: 2019-11-08

## 2020-01-10 ENCOUNTER — TELEPHONE (OUTPATIENT)
Dept: SURGERY | Facility: CLINIC | Age: 55
End: 2020-01-10

## 2020-01-10 NOTE — TELEPHONE ENCOUNTER
Pt called in with medication question.  States she has been ill since Monday night, thinks she has the flu.  Symptoms include body aches, sore throat, cough, headache, dizziness, fatigue.  Only had 1 episode of diarrhea Monday, otherwise no GI symptoms.  States she is staying hydrated and drinking lots of fluids.  Temp only 99.0, no fever.  Also beginning to have ear pain.  States she is planning to be seen by primary care today.    Wants to know what medications she can take for symptoms.  Discussed ok to take tylenol, sudafed, cough suppressants without NSAIDs.  Advised pt to check labels to make sure OTC meds do not contain NSAIDs.  Instructed pt to call back if her doctor puts her on an antibiotic or steroid at her appointment today.    Pt agreeable to plan.    Adalgisa Rosario RN on 1/10/2020 at 8:31 AM

## 2020-02-23 ENCOUNTER — HEALTH MAINTENANCE LETTER (OUTPATIENT)
Age: 55
End: 2020-02-23

## 2020-02-28 ENCOUNTER — OFFICE VISIT (OUTPATIENT)
Dept: SURGERY | Facility: CLINIC | Age: 55
End: 2020-02-28
Payer: COMMERCIAL

## 2020-02-28 VITALS
OXYGEN SATURATION: 99 % | HEIGHT: 69 IN | HEART RATE: 57 BPM | SYSTOLIC BLOOD PRESSURE: 107 MMHG | DIASTOLIC BLOOD PRESSURE: 64 MMHG | WEIGHT: 241.9 LBS | BODY MASS INDEX: 35.83 KG/M2

## 2020-02-28 VITALS — HEIGHT: 69 IN | WEIGHT: 241.9 LBS | BODY MASS INDEX: 35.83 KG/M2

## 2020-02-28 DIAGNOSIS — E66.01 SEVERE OBESITY (BMI 35.0-39.9) WITH COMORBIDITY (H): ICD-10-CM

## 2020-02-28 DIAGNOSIS — E66.01 MORBID OBESITY (H): ICD-10-CM

## 2020-02-28 DIAGNOSIS — Z98.84 BARIATRIC SURGERY STATUS: ICD-10-CM

## 2020-02-28 DIAGNOSIS — K91.2 POSTSURGICAL MALABSORPTION: ICD-10-CM

## 2020-02-28 DIAGNOSIS — E78.5 HYPERLIPIDEMIA, UNSPECIFIED HYPERLIPIDEMIA TYPE: ICD-10-CM

## 2020-02-28 DIAGNOSIS — E66.01 SEVERE OBESITY (BMI 35.0-39.9) WITH COMORBIDITY (H): Primary | ICD-10-CM

## 2020-02-28 DIAGNOSIS — K21.9 GERD WITHOUT ESOPHAGITIS: ICD-10-CM

## 2020-02-28 PROCEDURE — 99213 OFFICE O/P EST LOW 20 MIN: CPT | Performed by: PHYSICIAN ASSISTANT

## 2020-02-28 PROCEDURE — 97803 MED NUTRITION INDIV SUBSEQ: CPT | Performed by: DIETITIAN, REGISTERED

## 2020-02-28 ASSESSMENT — MIFFLIN-ST. JEOR
SCORE: 1766.63
SCORE: 1766.63

## 2020-02-28 NOTE — PROGRESS NOTES
"NUTRITION POST OP APPOINTMENT  DATE OF VISIT: February 28, 2020    Laura Nava  1965  female  [id number]  54 year old     ASSESSMENT:    REASON FOR VISIT:  Laura is a 54 year old year old female presents today for 1 year PO nutrition follow-up appointment. Patient is accompanied by self.    DIAGNOSIS:  Status post gastric sleeve surgery.  Obesity Obesity Grade II BMI 35-39.9     ANTHROPOMETRICS:  Initial Weight: 325.3 lbs   Height: 5' 9\"   Current Weight: 241 lbs 14.4 oz     BMI: Body mass index is 35.72 kg/m .    VITAMINS AND MINERALS:  2 Multivitamin with Minerals (AM)  650 mg Calcium With Vitamin D (BID - afternoon and evening)   (pt unsure of dose) International units Vitamin D  1000 mcg Vitamin B-12 sublingual  1 Vitamin B Complex; will switch to Thiamine   No iron needed per clinic guidelines    NUTRITION HISTORY:  Breakfast: [9am] Core Power protein shake (26g protein, 170 kcals)   Lunch: [12-12:30pm] yogurt + [place] snack pack (cheese, nuts, dried fruit)  Supper: [5:30pm] meat + vegetables + fruit + starch   Snacks: nuts and/or prunes  Fluids consumed: water/enhanced water (16oz), coffee (8oz, decaf, w/SF creamer), juice (2-3oz with pills), protein drink (12oz)  Consuming liquid calories: Yes  Protein intake: 60-80 grams/day  Tolerate regular texture food: Yes  Any foods not tolerated details: Yes  If any food not tolerated: bread, pasta, tough/dry meat   Portion size: 1 cup  Take 20-30 minutes to consume each meal: No (~15 minutes)   Eat protein foods first: Yes  Fluids and meals separate by at least 30 minutes: Yes  Chew foods thoroughly: Yes  Tolerating diet: Yes  Drinking high protein supplements: Yes  Consuming snacks per day: 1-2  Additional Information: Pt has noticed that her weight loss has slowed. Tracks intake via My Fitness Pal and eating 5270-7660 kcals. Decrease in exercise. Reviewed appropriate caloric intake and rationale behind avoiding snacking. Pt with many questions " about weight loss goals/expectations - will f/u with pt in 6 months to assess progress.       PHYSICAL ACTIVITY:  None     DIAGNOSIS:  Previous Nutrition Diagnosis: Altered gastrointestinal function related to alteration in gastrointestinal structure as evidenced by history of gastric sleeve surgery.- no change    Previous goals:  Change snacks to milk vs solid food - not met  Eat 3 food groups per meal - improving  Continue following post surgery diet guidelines - met    Current Nutrition Diagnosis: Altered gastrointestinal function related to alteration in gastrointestinal structure as evidenced by history of gastric sleeve surgery.    INTERVENTION:   Nutrition Prescription: Eat 3 meals a day at regular intervals. Consume 60-90 grams of protein daily. Follow post-surgical vitamin and mineral protocol.  Assessed learning needs and learning preferences.    GOALS:  Have 1 cup portions containing 3 food groups per meal  Have solid food at breakfast and use protein drink between meals as snack  Aim for 600-800 kcals    Follow-Up:   Recommend standard post-op follow up visits to assist with lifestyle changes or per insurance.  Implementation: Discussed progress toward previous goals; reinforced importance of following bariatric lifestyle changes.    NUTRITION MONITORING AND EVALUATION:  Anticipated compliance: fair-good  Verbalized good understanding.    Follow up: Patient to follow up in 6 months for check-in visit, then standard annual appointments    TIME SPENT WITH PATIENT:  35 minutes    Xiomy Alva RD, LD  Clinical Dietitian

## 2020-02-28 NOTE — PROGRESS NOTES
"BARIATRIC FOLLOW UP VISIT     February 28, 2020       HISTORY OF PRESENT ILLNESS: Pt returns today for her follow-up appointment status post laparoscopic gastric sleeve.  Currently taking zantac bid.  Not sure of dose.  If skips a dose will have heartburn about 12 hours later.  Otherwise controls it well.  Has stopped exercising during the winter and feels she has not lost as much weight as she should have.     Initial Weight (lbs): 325.3 lbs   Current Weight: 241 lb 14.4 oz (109.7 kg)  Cumulative weight loss (lbs): 83.4  Last Visits Weight: 254 lb (115.2 kg)     Patient is taking the following bariatric postoperative vitamins:  2 Multivitamin with Minerals (AM)  650 mg Calcium With Vitamin D (BID - afternoon and evening)   (pt unsure of dose) International units Vitamin D  1000 mcg Vitamin B-12 sublingual  1 Vitamin B Complex; will switch to Thiamine      Pt is not exercising. Stopped walking over the holidays       OBESITY RELATED CONDITIONS:  High cholesterol - decreased med in half  Joint pain - knees and ankle - much improved  GERD without esophagitis - present  Migraines - much improved        SOCIAL HISTORY:  Pt denies smoking.  Pt denies alcohol use.  Avoids NSAIDS.  No caffeine    REVIEW OF SYSTEMS:     GI:  Nausea-  No  Vomiting- No  Diarrhea- No  Constipation- sometimes.  Have 4 prunes sometimes.  Drinks 48-50 oz water  Dysphagia- No  Abdominal Pain- No  Heartburn- sometimes     SKIN:  Intertriginous irritation- No  Telogen effluvium - improving     PSYCH:  Depression- No  Anxiety- No      LABS/IMAGING/MEDICAL RECORDS REVIEW: says her 6 month labs were done at a different clinic    PHYSICAL EXAMINATION:   /64   Pulse 57   Ht 5' 9\" (1.753 m)   Wt 241 lb 14.4 oz (109.7 kg)   SpO2 99%   BMI 35.72 kg/m      GENERAL: Alert and oriented x3. NAD  HEART: No murmurs, rubs or gallops, Regular rate and rhythm  LUNGS: Breathing unlabored, Lung sounds clear to auscultation bilaterally  ABDOMEN: soft; " nontender; nondistended, incision well healed. No hernia  EXTREMITIES: No LE edema bilaterally, Gait normal  SKIN: No intertriginous irritation or rash      ASSESSMENT AND PLAN:      1 years status laparoscopic gastric sleeve  Morbid Obesity - Resolved  Obesity Body mass index is 35.72 kg/m ..  Post surgical malabsorption:   Ordered CBC, vitamin B12, vitamin D, PTH, ferritin, TIBC, and iron labs.   Follow food plan per dietitian recommendations.   Continue taking recommended post-op vitamins.  Exercise goal is to walk 20-30 minutes at lunch 5 days weekly  GERD without esophagitis - wants to finish off her zantac.  Has quite a bit left. When that is done patient can switch over to omeprazole 20 mg daily.  Is not eating or drinking 1-2 hours before bedtime and using 1-2 pillows.  Constipation - continue current regime  Return to clinic in 1 year PA and 6 month diet.      I spent a total of 15 minutes face to face with Laura during today's office visit. Over 50% of this time was spent counseling the patient and/or coordinating care.

## 2020-03-01 ENCOUNTER — MYC MEDICAL ADVICE (OUTPATIENT)
Dept: SURGERY | Facility: CLINIC | Age: 55
End: 2020-03-01

## 2020-03-05 NOTE — TELEPHONE ENCOUNTER
Called and left patient a voicemail to call clinic back re: her request for zantac.      Zantac is on recall so a prescription for pepcid otc can be sent over instead.  Or she can go to omeprazole and a refill can be sent over if needed.        Eleonora London MS, PADanisC

## 2020-04-03 DIAGNOSIS — K21.9 GERD WITHOUT ESOPHAGITIS: ICD-10-CM

## 2020-04-03 DIAGNOSIS — Z98.84 BARIATRIC SURGERY STATUS: Primary | ICD-10-CM

## 2020-12-06 ENCOUNTER — HEALTH MAINTENANCE LETTER (OUTPATIENT)
Age: 55
End: 2020-12-06

## 2021-01-15 ENCOUNTER — HEALTH MAINTENANCE LETTER (OUTPATIENT)
Age: 56
End: 2021-01-15

## 2021-03-22 ENCOUNTER — TRANSFERRED RECORDS (OUTPATIENT)
Dept: HEALTH INFORMATION MANAGEMENT | Facility: CLINIC | Age: 56
End: 2021-03-22

## 2021-03-22 LAB
CHOLEST SERPL-MCNC: 173 MG/DL
CREAT SERPL-MCNC: 0.8 MG/DL (ref 0.5–1.2)
GFR SERPL CREATININE-BSD FRML MDRD: 83 ML/MIN/1.73
GLUCOSE SERPL-MCNC: 80 MG/DL (ref 65–99)
HDLC SERPL-MCNC: 62 MG/DL (ref 40–60)
LDLC SERPL CALC-MCNC: 98 MG/DL
POTASSIUM SERPL-SCNC: 4.4 MMOL/L (ref 3.5–5)
TRIGL SERPL-MCNC: 63 MG/DL (ref 35–160)

## 2021-03-24 ENCOUNTER — DOCUMENTATION ONLY (OUTPATIENT)
Dept: SURGERY | Facility: CLINIC | Age: 56
End: 2021-03-24

## 2021-03-24 NOTE — PROGRESS NOTES
Annual labs faxed from PCP clinic.  Patient not seen in [facility] clinic by provider since 4/30/21.    Was to make appointment for refill of omeprazole as well as be seen for annual.  Not done as of yet.    Will notify provide of labs that were stat scanned to chart at 1326 today and verified with Right Fax.   Naa Gonsales, MS, RD, RN

## 2021-04-30 NOTE — TELEPHONE ENCOUNTER
Reason for call:  Other   Patient called regarding (reason for call): call back  Additional comments: Pt has some insurance questions and she would like to give new coverage info    Phone number to reach patient:  Cell number on file:    Telephone Information:   Mobile [phone]       Best Time:  anytime    Can we leave a detailed message on this number?  YES   CDU1

## 2021-09-25 ENCOUNTER — HEALTH MAINTENANCE LETTER (OUTPATIENT)
Age: 56
End: 2021-09-25

## 2021-10-04 ENCOUNTER — DOCUMENTATION ONLY (OUTPATIENT)
Dept: SURGERY | Facility: CLINIC | Age: 56
End: 2021-10-04

## 2021-12-03 ENCOUNTER — TRANSFERRED RECORDS (OUTPATIENT)
Dept: HEALTH INFORMATION MANAGEMENT | Facility: CLINIC | Age: 56
End: 2021-12-03
Payer: COMMERCIAL

## 2022-01-03 ENCOUNTER — VIRTUAL VISIT (OUTPATIENT)
Dept: SURGERY | Facility: CLINIC | Age: 57
End: 2022-01-03
Payer: COMMERCIAL

## 2022-01-03 VITALS — WEIGHT: 254 LBS | BODY MASS INDEX: 37.62 KG/M2 | HEIGHT: 69 IN

## 2022-01-03 DIAGNOSIS — E66.812 CLASS 2 SEVERE OBESITY DUE TO EXCESS CALORIES WITH SERIOUS COMORBIDITY AND BODY MASS INDEX (BMI) OF 37.0 TO 37.9 IN ADULT (H): ICD-10-CM

## 2022-01-03 DIAGNOSIS — E66.01 CLASS 2 SEVERE OBESITY DUE TO EXCESS CALORIES WITH SERIOUS COMORBIDITY AND BODY MASS INDEX (BMI) OF 37.0 TO 37.9 IN ADULT (H): ICD-10-CM

## 2022-01-03 DIAGNOSIS — K91.2 POSTSURGICAL MALABSORPTION: ICD-10-CM

## 2022-01-03 DIAGNOSIS — Z98.84 BARIATRIC SURGERY STATUS: Primary | ICD-10-CM

## 2022-01-03 DIAGNOSIS — K21.9 GERD WITHOUT ESOPHAGITIS: ICD-10-CM

## 2022-01-03 PROCEDURE — 99215 OFFICE O/P EST HI 40 MIN: CPT | Mod: 95 | Performed by: PHYSICIAN ASSISTANT

## 2022-01-03 ASSESSMENT — MIFFLIN-ST. JEOR: SCORE: 1811.52

## 2022-01-03 NOTE — PATIENT INSTRUCTIONS
It was great talking with you today! Below is some information about the medication we discussed.      I will need weight, blood pressure and pulse before sending over the phentermine and then again 7-10 days after starting it.     Please call [phone] and schedule with dietitian as well as a follow up in person visit in 4-5 weeks.      Eleonora FAIR         Information about the Weight Loss Medication Phentermine      Phentermine is a stimulant medication related to the amphetamine class of medication but with a lower risk of dependence and addiction.  It is used for weight loss by suppressing the appetite region of the brain.  It also may speed up the metabolic rate and give a person more energy.  Like any medication there are potential side effects and the most common are:    Dry mouth occurs in almost everyone (hydrate well), fewer people experience Palpatations, fast heart rate, elevation of blood pressure, restlessness, insomnia, dizziness, change in mood, tremor, headache, changes in bowel movements,itchiness, changes in sex drive.    Take one tablet in the morning. Contact the nurse via [employer] or call [phone] if you have any questions or concerns. (Do not stop taking it if you don't think it's working. For some people it works without them knowing it.)    Phentermine is being prescribed because you identified hunger as one of the main causes for your extra weight.      Our patients on Phentermine find that they:    >feel less hunger    >find it easier to push the plate away   >have an easier time eating less    For some of our patients, these feelings are very real and immediate. For other patients, the feelings are less obvious. They don't feel much of a change but find they've lost weight. Like all weight loss medications, Phentermine  works best when you help it work. This means:  1. Having less tempting high calorie (fattening) food around the house or office. (For people with strong cravings  this is very important.)   2. Staying away from situations or people that may trigger your cravings .   3. Eating out only one time or less each week.  4. Eating your meals at a table with the TV or computer off.          In order to get refills of this or any medication we prescribe you must be seen in the medical weight mgmt clinic every 2-3 months.        ASSESSMENT AND PLAN:    3 years status laparoscopic gastric sleeve  Morbid Obesity - resolved -  Body mass index is 37.51 kg/m .   Discussed the importance of lifestyle and diet for long term success  Post surgical malabsorption:   Ordered CBC,vitamin A, vitamin B12, vitamin D, PTH, ferritin, TIBC, and iron labs.   Follow food plan per dietitian recommendations.   Continue taking recommended post-op vitamins.  GERD without esophagitis - Continue with omeprazole 20 mg daily. Consider twice daily. Patient will journal on the days she has break through symptoms to try and identify any particular reason. Strongly advised she stop all caffeineated coffee.

## 2022-01-03 NOTE — PROGRESS NOTES
Laura is a 56 year old who is being evaluated via a billable video visit.      If the video visit is dropped, the invitation should be resent by: Text to cell phone: [phone]  Will anyone else be joining your video visit? No      Video-Visit Details    Type of service:  Video Visit    Video Start Time: 9:37    Video End Time: 10:15       50 minutes spent on the date of the encounter doing chart review, review of test results, patient visit and documentation       Originating Location (pt. Location): Home    Distant Location (provider location):  [facility] [place]     Platform used for Video Visit: [employer]            VIRTUAL BARIATRIC FOLLOW UP VISIT         January 3, 2022       HISTORY OF PRESENT ILLNESS: Pt returns today for her follow-up appointment 3 years status post laparoscopic gastric sleeve.  Feels like she stopped losing weight right after her first year post op. Held that weight for about 6 months but has slowly started increasing in weight.  She is now up 18 lbs from her lowest weight. Feels hungry a lot and thinks this is why. Not purposefully exercising      Initial Weight (lbs): 325.3 lbs   Current Weight: 254 lb (115.2 kg) (pt reported)  Cumulative weight loss (lbs): 71.3  Last Visits Weight: 241 lb 14.4 oz (109.7 kg)        BARIATRIC METRICS:  Current Weight: 254 lb (115.2 kg) (pt reported)  Body mass index is 37.51 kg/m .   Wt change since last visit (lbs): 12.1  Cumulative weight loss (lbs): 71.3       Patient is taking the following bariatric postoperative vitamins:  1 Multivitamin with Minerals (AM)  650 mg Calcium With Vitamin D (BID - afternoon and evening)   2000 International units Vitamin D  1000 mcg Vitamin B-12 sublingual  Vitamin B12 2500 mcg SL - every other day   Was supposed to get B1 but has not      Pt is not exercising at all. Busy and does not like to workout at home.        OBESITY RELATED CONDITIONS:  High cholesterol - continues to  "improve  Joint pain - knees and ankle - resolved  GERD without esophagitis - present  Migraines - resolved        SOCIAL HISTORY:  Pt denies smoking.  Pt denies alcohol use.  Avoids NSAIDS.  Drinks 1 cup of coffee every morning.       REVIEW OF SYSTEMS:     GI:  Nausea- sometimes  Vomiting- No  Diarrhea- on and off since surgery  Constipation- on and off since surgery  Dysphagia- No  Abdominal Pain- No  Heartburn- Takes omeprazole daily. Still fights reflux weekly while on it.  Happens about 1 time nightly. Will cough and choke acid for a while.  Will take a tums during this time.  Does sleep with 2 pillows     SKIN:  Intertriginous irritation- No     PSYCH:  Depression- No  Anxiety- No      LABS/IMAGING/MEDICAL RECORDS REVIEW: [facility]    PHYSICAL EXAMINATION:   Ht 5' 9\" (1.753 m)   Wt 254 lb (115.2 kg)   BMI 37.51 kg/m    GENERAL: Alert and oriented x3. NAD  HEART: No murmurs, rubs or gallops, Regular rate and rhythm  LUNGS: Breathing unlabored, Lung sounds clear to auscultation bilaterally  ABDOMEN: soft; nontender; nondistended, incision well healed. No hernia  EXTREMITIES: No LE edema bilaterally, Gait normal  SKIN: No intertriginous irritation or rash        ASSESSMENT AND PLAN:    3 years status laparoscopic gastric sleeve  Morbid Obesity - resolved -  Body mass index is 37.51 kg/m .    Discussed the importance of lifestyle and diet for long term success  Post surgical malabsorption:   Ordered CBC,vitamin A, vitamin B12, vitamin D, PTH, ferritin, TIBC, and iron labs.   Follow food plan per dietitian recommendations.   Continue taking recommended post-op vitamins.  GERD without esophagitis - Continue with omeprazole 20 mg daily. Consider twice daily. Patient will journal on the days she has break through symptoms to try and identify any particular reason. Strongly advised she stop all caffeineated coffee.  Weight regain  Discussed the importance of lifestyle including purposeful exercise for long term success. " "Also recommended patient follow up with dietitian.    We discussed the role of pharmacological agents in the treatment of obesity and the \"off-label\" use of medications in this practice. We discussed the risks and benefits of each. We discussed indications, contraindications, potential side effects, and estimated costs of each. Discussed that medications must always be used together with lifestyle changes such as improvements in diet choices, portion control and establishing and maintaining a regular exercise program.     Discussed trying phentermine 15 mg capsules.  Denies glaucoma, heart disease, anxiety or palpitations.  Will need weight, blood pressure and pulse before will send over phentermine and then again 7-10 days after starting.      All of patients questions about the weight loss program were answered fully.      Return to clinic in 4 - 5 weeks after starting medication.      Eleonora London MS, PA-C            "

## 2022-01-06 ENCOUNTER — TRANSFERRED RECORDS (OUTPATIENT)
Dept: HEALTH INFORMATION MANAGEMENT | Facility: CLINIC | Age: 57
End: 2022-01-06
Payer: COMMERCIAL

## 2022-01-06 LAB
CHOLESTEROL (EXTERNAL): 202 MG/DL
CREATININE (EXTERNAL): 0.6 MG/DL (ref 0.5–1.2)
GFR ESTIMATED (EXTERNAL): 102 ML/MIN/1.73
GFR ESTIMATED (IF AFRICAN AMERICAN) (EXTERNAL): 119 ML/MIN/1.73
GLUCOSE (EXTERNAL): 79 MG/DL (ref 65–99)
HBA1C MFR BLD: 5.1 %
HDLC SERPL-MCNC: 62 MG/DL (ref 40–60)
LDL CHOLESTEROL CALCULATED (EXTERNAL): 121 MG/DL
POTASSIUM (EXTERNAL): 4.1 MMOL/L (ref 3.5–5)
TRIGLYCERIDES (EXTERNAL): 97 MG/DL (ref 35–160)

## 2022-01-07 DIAGNOSIS — E66.01 CLASS 2 SEVERE OBESITY DUE TO EXCESS CALORIES WITH SERIOUS COMORBIDITY AND BODY MASS INDEX (BMI) OF 37.0 TO 37.9 IN ADULT (H): Primary | ICD-10-CM

## 2022-01-07 DIAGNOSIS — E66.812 CLASS 2 SEVERE OBESITY DUE TO EXCESS CALORIES WITH SERIOUS COMORBIDITY AND BODY MASS INDEX (BMI) OF 37.0 TO 37.9 IN ADULT (H): Primary | ICD-10-CM

## 2022-01-07 RX ORDER — PHENTERMINE HYDROCHLORIDE 15 MG/1
15 CAPSULE ORAL EVERY MORNING
Qty: 40 CAPSULE | Refills: 0 | Status: SHIPPED | OUTPATIENT
Start: 2022-01-07 | End: 2022-01-31

## 2022-01-15 ENCOUNTER — HEALTH MAINTENANCE LETTER (OUTPATIENT)
Age: 57
End: 2022-01-15

## 2022-01-31 DIAGNOSIS — E66.812 CLASS 2 SEVERE OBESITY DUE TO EXCESS CALORIES WITH SERIOUS COMORBIDITY AND BODY MASS INDEX (BMI) OF 37.0 TO 37.9 IN ADULT (H): ICD-10-CM

## 2022-01-31 DIAGNOSIS — E66.01 CLASS 2 SEVERE OBESITY DUE TO EXCESS CALORIES WITH SERIOUS COMORBIDITY AND BODY MASS INDEX (BMI) OF 37.0 TO 37.9 IN ADULT (H): ICD-10-CM

## 2022-01-31 RX ORDER — PHENTERMINE HYDROCHLORIDE 15 MG/1
CAPSULE ORAL
Qty: 20 CAPSULE | Refills: 0 | Status: SHIPPED | OUTPATIENT
Start: 2022-01-31 | End: 2022-02-09

## 2022-02-09 ENCOUNTER — OFFICE VISIT (OUTPATIENT)
Dept: SURGERY | Facility: CLINIC | Age: 57
End: 2022-02-09
Payer: COMMERCIAL

## 2022-02-09 VITALS
DIASTOLIC BLOOD PRESSURE: 68 MMHG | SYSTOLIC BLOOD PRESSURE: 118 MMHG | BODY MASS INDEX: 37.34 KG/M2 | WEIGHT: 252.1 LBS | HEART RATE: 68 BPM | OXYGEN SATURATION: 96 % | HEIGHT: 69 IN

## 2022-02-09 DIAGNOSIS — Z98.84 BARIATRIC SURGERY STATUS: Primary | ICD-10-CM

## 2022-02-09 DIAGNOSIS — E66.01 CLASS 2 SEVERE OBESITY DUE TO EXCESS CALORIES WITH SERIOUS COMORBIDITY AND BODY MASS INDEX (BMI) OF 37.0 TO 37.9 IN ADULT (H): ICD-10-CM

## 2022-02-09 DIAGNOSIS — E66.812 CLASS 2 SEVERE OBESITY DUE TO EXCESS CALORIES WITH SERIOUS COMORBIDITY AND BODY MASS INDEX (BMI) OF 37.0 TO 37.9 IN ADULT (H): ICD-10-CM

## 2022-02-09 PROCEDURE — 99213 OFFICE O/P EST LOW 20 MIN: CPT | Performed by: PHYSICIAN ASSISTANT

## 2022-02-09 RX ORDER — PHENTERMINE HYDROCHLORIDE 15 MG/1
CAPSULE ORAL
Qty: 180 CAPSULE | Refills: 0 | Status: SHIPPED | OUTPATIENT
Start: 2022-02-09 | End: 2022-06-28

## 2022-02-09 ASSESSMENT — MIFFLIN-ST. JEOR: SCORE: 1802.9

## 2022-02-09 NOTE — PROGRESS NOTES
2022        Return Medical Weight Management Note     Laura Nava  MRN:  [id number]  :  1965      Dear Vicki Ruiz,    I had the pleasure of doing a visit with your patient Laura Nava.  She is a 56 year old female who I am continuing to see for treatment of obesity related to:       10/2/2018   I have the following health issues associated with obesity: High Cholesterol, Weight Bearing Joint Pain       INTERVAL HISTORY:  Patient is 3 years SP Gastric Sleeve and was started on phentermine 15 mg capsules last month. After about 2 weeks increased to 30 mg daily.  With this increase notices she does not need to snack as much and not eating at night time.  No anxiety, chest pain or SOB.  Does feel a bit more jittery but not bad.        CURRENT WEIGHT:   252 lbs 1.6 oz    Wt Readings from Last 4 Encounters:   22 252 lb 1.6 oz (114.4 kg)   22 254 lb (115.2 kg)   20 241 lb 14.4 oz (109.7 kg)   20 241 lb 14.4 oz (109.7 kg)       DIETARY HISTORY  Meals Per Day: 3  Food Diary:   B: Decaf coffee with creamer, nutrigrain bar and later a protein shake  L: single serve yogurt with cup of cranberries and nuts and cheese  D: pork roast, mashed potatoes, dinner roll with butter  Snacks Per Day: 1  Typical Snack: had 1 cookie yesterday  Fluid Intake: 42 oz water  Calorie Containing Beverages: coffee with creamer  Meals at Restaurant per week: 0-1      Exercise:  Walking on treadmill for 30 minutes - 5 days weekly      ROS: 2022  General  Fatigue: phentermine has given more energy  Sleep Quality:OK  Insomnia: No  HEENT  Dry Mouth: No  Hx of glaucoma: No  Vision changes: No  Cardiovascular  Chest Pain with Exertion: No  Palpitations: No  Hx of heart disease: No  HTN: No  Pulmonary  Shortness of breath at rest: No  Shortness of breath with exertion: No  Gastrointestinal  Nausea: No  Diarrhea: No  Heartburn: No  Abdominal pain: No  Constipation: No  Psychiatric  Moods  "Stable: yes      MEDICATIONS:   Current Outpatient Medications   Medication     acetaminophen (TYLENOL) 325 MG tablet     Calcium Carb-Ergocalciferol (CHEWABLE CALCIUM/D PO)     childrens multivitamin w/iron (FLINTSTONES COMPLETE) 60 MG chewable tablet     omeprazole (PRILOSEC) 20 MG DR capsule     phentermine (ADIPEX-P) 15 MG capsule     pravastatin (PRAVACHOL) 40 MG tablet     scopolamine (TRANSDERM) 1 MG/3DAYS 72 hr patch     sertraline (ZOLOFT) 50 MG tablet     vitamin D3 (CHOLECALCIFEROL) 2000 units tablet     No current facility-administered medications for this visit.       PE:  /68   Pulse 68   Ht 5' 9\" (1.753 m)   Wt 252 lb 1.6 oz (114.4 kg)   SpO2 96%   BMI 37.23 kg/m    GENERAL: Healthy, alert and no distress  EYES: Eyes grossly normal to inspection.  No discharge or erythema, or obvious scleral/conjunctival abnormalities.  RESP: No audible wheeze, cough, or visible cyanosis.  No visible retractions or increased work of breathing.    SKIN: Visible skin clear. No significant rash, abnormal pigmentation or lesions.  NEURO: Cranial nerves grossly intact.  Mentation and speech appropriate for age.  PSYCH: Mentation appears normal, affect normal/bright, judgement and insight intact, normal speech and appearance well-groomed.        ASSESSMENT/PLAN:   Class 2 severe obesity due to excess calories with Body Mass Index (BMI) of 37  Bariatric status      Congratulated patient on her lifestyle changes and weight loss.  Encouraged her to continue with exercising on a regular basis and to increase her water intake. Would like to continue with the phentermine. Will send over 3 month supply of phentermine 30 mg capsules.        Follow up in 3 months      Eleonora London MS, PADanisC      21 minutes spent on the date of the encounter doing chart review, review of test results, patient visit and documentation     "

## 2022-02-09 NOTE — PATIENT INSTRUCTIONS
It was good seeing you today!  Please schedule for a return visit in 3 months      Have a great afternoon          Eat Better ? Move More ? Live Well    Eat 3 nutrient-rich meals each day     Don t skip meals--it will cause you to overeat later in the day!     Eating fiber (vegetables/fruits/whole grains) and protein with meals helps you stay full longer     Choose foods with less than 10 grams of sugar and 5 grams of fat per serving to prevent excess calories and weight re-gain   Eat around the same times each day to develop a routine eating schedule    Avoid snacking unless physically hungry.   Planned snacks: 1-2 times per day and no more than 150 calories    Eat protein first    Protein helps with healing, maintaining adequate muscle mass, reducing hunger and optimizing nutritional status    Aim for 60-80 grams of protein per day   Fill up on Fiber    Fiber comes from plants--fruits, veggies, whole grains, nuts/seeds and beans    Fiber is low in calories, high in phytonutrients and helps you stay full longer    Aim for 25-35 grams per day by eating fiber with meals and snacks  Eat [id number]    Take 20-30 minutes to eat each meal by taking small bites, chewing foods to applesauce consistency or 20-30 times before you swallow    Eating foods too fast can delay satiety/fullness signals and increase overeating   ? Slow down your eating by using toddler utensils, putting your fork/spoon down between bites and not watching TV or emailing during meals!   Keep a Journal          Writing down what you eat, how you feel and when you are active helps you identify new changes to work on from week to week          Look for ways to cut 100 calories from your current diet 2-3 times per day  Drink 64 ounces of 0-Calorie drinks between meals    Water    Zero calorie Propel  or Vitamin Water      So[employer]  Zero Calories    Crystal Light , Sugar-Free Toni-Aid , and other sugar-free lemonade or flavored christian    Keep  Caffeine to less than 300mg per day ie: 3-6oz cups coffee     Work up to 45-60 minutes of physical activity most days of the week    Helps with losing weight and prevent regaining those extra pounds!     Do a combo of cardio (walking/water exercises) and strength training (lifting weights/Vinyasa yoga)    Avoid Mindless Eating    Be present when you eat--take note of the smell, taste and quality of your food    Make a list of alternative activities you could do to prevent eating out of boredom/stress  ? Go for a walk, call a friend, chew gum, paint your nails, re-organize the garage, etc

## 2022-06-28 ENCOUNTER — OFFICE VISIT (OUTPATIENT)
Dept: SURGERY | Facility: CLINIC | Age: 57
End: 2022-06-28
Payer: COMMERCIAL

## 2022-06-28 VITALS
HEART RATE: 55 BPM | BODY MASS INDEX: 37.98 KG/M2 | DIASTOLIC BLOOD PRESSURE: 88 MMHG | OXYGEN SATURATION: 98 % | HEIGHT: 69 IN | WEIGHT: 256.4 LBS | SYSTOLIC BLOOD PRESSURE: 124 MMHG

## 2022-06-28 DIAGNOSIS — E66.812 CLASS 2 SEVERE OBESITY DUE TO EXCESS CALORIES WITH SERIOUS COMORBIDITY AND BODY MASS INDEX (BMI) OF 37.0 TO 37.9 IN ADULT (H): Primary | ICD-10-CM

## 2022-06-28 DIAGNOSIS — E66.01 CLASS 2 SEVERE OBESITY DUE TO EXCESS CALORIES WITH SERIOUS COMORBIDITY AND BODY MASS INDEX (BMI) OF 37.0 TO 37.9 IN ADULT (H): Primary | ICD-10-CM

## 2022-06-28 DIAGNOSIS — Z98.84 BARIATRIC SURGERY STATUS: ICD-10-CM

## 2022-06-28 PROCEDURE — 99213 OFFICE O/P EST LOW 20 MIN: CPT | Performed by: PHYSICIAN ASSISTANT

## 2022-06-28 RX ORDER — TOPIRAMATE 25 MG/1
TABLET, FILM COATED ORAL
Qty: 90 TABLET | Refills: 1 | Status: SHIPPED | OUTPATIENT
Start: 2022-06-28 | End: 2022-09-07

## 2022-06-28 NOTE — PROGRESS NOTES
2022        Return Medical Weight Management Note       Laura Nava  MRN:  [id number]  :  1965      Dear Vicki Ruiz,    I had the pleasure of doing a visit with your patient Laura Nava. She is a 56 year old female who I am continuing to see for treatment of obesity related to:       10/2/2018   I have the following health issues associated with obesity: High Cholesterol, Weight Bearing Joint Pain       INTERVAL HISTORY:  3 years SP Gastric Sleeve Patient took phentermine for about 4 months earlier this year. Lost 10lbs and then stalled out. She has regained the 10 lbs.  Feels like stopping it really made her very hungry. Things are just starting to calm down now.  In today to discuss other weight loss options.      CURRENT WEIGHT:   256 lbs 6.4 oz    Wt Readings from Last 4 Encounters:   22 256 lb 6.4 oz (116.3 kg)   22 252 lb 1.6 oz (114.4 kg)   22 254 lb (115.2 kg)   20 241 lb 14.4 oz (109.7 kg)       BARIATRIC METRICS:  Current Weight: 256 lb 6.4 oz (116.3 kg)  Body mass index is 37.86 kg/m .   Wt change since last visit (lbs): 4.3  Cumulative weight loss (lbs): 68.9      DIETARY HISTORY  Meals Per Day: 3  Food Diary:   B: [employer]  nutrigrain bar minmorning  L: dannon yogurt and cheese  D: 2 eggs and cheese omlette  Snacks Per Day: yes - tries to keep it to fruit      Exercise: Has been doing terrible lately. Walking.        ROS: 2022  General  Fatigue: getting better  Sleep Quality:  Insomnia: No  HEENT  Dry Mouth:No  Hx of glaucoma: No  Vision changes: No  Cardiovascular  Chest Pain with Exertion: No  Palpitations: No  Hx of heart disease: No  HTN: No  Pulmonary  Shortness of breath at rest: No  Shortness of breath with exertion: No  Gastrointestinal  Nausea: No  Diarrhea: No  Heartburn: No  Abdominal pain: No  Constipation: yes sometimes  Psychiatric  Moods Stable: Yes  Anxiety: No  Depression: No  Neurologic:  Hx of seizures: No  Hx  "of paresthesias: No  Headaches: No  Tremor: No    History of kidney stones: No  History of kidney disease: No  Current birth control: postmenopausal for a year.       MEDICATIONS:   Current Outpatient Medications   Medication     acetaminophen (TYLENOL) 325 MG tablet     Calcium Carb-Ergocalciferol (CHEWABLE CALCIUM/D PO)     childrens multivitamin w/iron (FLINTSTONES COMPLETE) 60 MG chewable tablet     omeprazole (PRILOSEC) 20 MG DR capsule     pravastatin (PRAVACHOL) 40 MG tablet     scopolamine (TRANSDERM) 1 MG/3DAYS 72 hr patch     sertraline (ZOLOFT) 50 MG tablet     vitamin D3 (CHOLECALCIFEROL) 2000 units tablet     No current facility-administered medications for this visit.       PE:  /88   Pulse 55   Ht 5' 9\" (1.753 m)   Wt 256 lb 6.4 oz (116.3 kg)   SpO2 98%   BMI 37.86 kg/m    GENERAL: Healthy, alert and no distress  EYES: Eyes grossly normal to inspection.  No discharge or erythema, or obvious scleral/conjunctival abnormalities.  RESP: No audible wheeze, cough, or visible cyanosis.  No visible retractions or increased work of breathing.    SKIN: Visible skin clear. No significant rash, abnormal pigmentation or lesions.  NEURO: Cranial nerves grossly intact.  Mentation and speech appropriate for age.  PSYCH: Mentation appears normal, affect normal/bright, judgement and insight intact, normal speech and appearance well-groomed.        ASSESSMENT/PLAN:   Class 2 severe obesity due to excess calories with Body Mass Index (BMI) of 37  Bariatric Surgery Status      Set goal to walk after work 4 days for 30-60 minutes. Has her treadmill.at home. Encouraged her to do 1-2 additional exercise sessions that will increase her heart rate      We discussed the role of pharmacological agents in the treatment of obesity and the \"off-label\" use of medications in this practice. We discussed the risks and benefits of each. We discussed indications, contraindications, potential side effects, and estimated costs " of each. Discussed that medications must always be used together with lifestyle changes such as improvements in diet choices, portion control and establishing and maintaining a regular exercise program.      Discussed topamax.  Reviewed side effects.  Patient information provided.  Patient to follow up in 6 weeks.      Eleonora London MS, PA-C          26 minutes spent on the date of the encounter doing chart review, review of test results, patient visit and documentation

## 2022-06-28 NOTE — PATIENT INSTRUCTIONS
MEDICATION STARTED AT THIS APPOINTMENT  We are starting topiramate at bedtime.  Start one tab, 25 mg, for a week. Go up to 50 mg (2 tabs) for the next week. At the third week, take   3 tabs (75 mg).  Stay at 3 tabs until you are seen again. Contact the nurse via [address] or call [phone] if you have any questions or concerns. (Do not stop taking it if you don't think it's working. For some people it works even though they do not feel much different.)    Topiramate (Topamax) is a medication that is used most often to treat migraine headaches or for seizures. It has also been found to help with weight loss. Although it's not currently FDA approved for weight loss, it has been used safely for a number of years to help people who are carrying extra weight.     Just how topiramate helps with weight loss has not been exactly determined. However it seems to work on areas of the brain to quiet down signals related to eating.      Topiramate may make you:    >feel less interest in eating in between meals   >think less about food and eating   >find it easier to push the plate away   >find giving up pop easier    >have an easier time eating less    For some of our patients, the pills work right away. They feel and think quite differently about food. Other patients don't feel much of a change but find in fact they have lost weight! Like all weight loss medications, topiramate works best when you help it work.  This means:    1) Have less tempting high calorie (fattening) food around the house or office    2) Have lower calorie food (fruits, vegetables,low fat meats and dairy) for snacks    3) Eat out only one time or less each week.   4) Eat your meals at a table with the TV or computer off.    Side-effects. Topiramate is generally well tolerated. The main side-effects we see are:   Tingling in hands,feet, or face (usually not very troublesome)   Mental confusion and word finding trouble (about 10% of patients have this.)      Feeling sleepy or a bit dopey- this goes away very soon after starting.    One of the dangers of topiramate is the possibility of birth defects--if you get pregnant when you are on it, there is the risk that your baby will be born with a cleft lip or palate.  If you are on topiramate and of child bearing age, you need to be on a reliable form of birth control or refrain from sexual intercourse.     Please refer to the pharmacy insert for more information on side-effects. Since many pharmacists are not familiar with the use of topiramate in weight loss, calling the clinic will get you the most accurate information on the use of this medication for weight loss.     In order to get refills of this or any medication we prescribe you must be seen in the medical weight mgmt clinic every 2-3 months.

## 2022-07-22 DIAGNOSIS — E66.01 CLASS 2 SEVERE OBESITY DUE TO EXCESS CALORIES WITH SERIOUS COMORBIDITY AND BODY MASS INDEX (BMI) OF 37.0 TO 37.9 IN ADULT (H): ICD-10-CM

## 2022-07-22 DIAGNOSIS — E66.812 CLASS 2 SEVERE OBESITY DUE TO EXCESS CALORIES WITH SERIOUS COMORBIDITY AND BODY MASS INDEX (BMI) OF 37.0 TO 37.9 IN ADULT (H): ICD-10-CM

## 2022-07-22 RX ORDER — TOPIRAMATE 25 MG/1
TABLET, FILM COATED ORAL
Qty: 90 TABLET | Refills: 1 | OUTPATIENT
Start: 2022-07-22

## 2022-07-22 NOTE — TELEPHONE ENCOUNTER
Per MARV Crews - too early to refill topiramate.  Refuse - this was done.  Called pt and informed her that she can take topiramate at any time during the day and that 3 at night was only a suggestion.  Also, reminded pt that it takes about 4 weeks for med to work once at goal.  Patient verbalized understanding and is agreeable to plan.  Naa Gonsales, MS, RD, RN

## 2022-08-08 DIAGNOSIS — E66.01 CLASS 2 SEVERE OBESITY DUE TO EXCESS CALORIES WITH SERIOUS COMORBIDITY AND BODY MASS INDEX (BMI) OF 37.0 TO 37.9 IN ADULT (H): ICD-10-CM

## 2022-08-08 DIAGNOSIS — E66.812 CLASS 2 SEVERE OBESITY DUE TO EXCESS CALORIES WITH SERIOUS COMORBIDITY AND BODY MASS INDEX (BMI) OF 37.0 TO 37.9 IN ADULT (H): ICD-10-CM

## 2022-08-08 RX ORDER — TOPIRAMATE 25 MG/1
TABLET, FILM COATED ORAL
Qty: 90 TABLET | Refills: 1 | OUTPATIENT
Start: 2022-08-08

## 2022-08-08 NOTE — TELEPHONE ENCOUNTER
Per pt call.  Has only 2 topiramate left and has to take 3 tonight.  Has appt scheduled with MARV Crews 8/10/22.  Will route to provider for refill.  Naa Gonsales MS, RD, RN     No complaints

## 2022-08-08 NOTE — TELEPHONE ENCOUNTER
Called pharm and has 1 RF left.  Called pt to update.  LM to call clinic today and sent  msg and requested response.   Naa Gonsales MS, RD, RN

## 2022-08-08 NOTE — TELEPHONE ENCOUNTER
Called pt and LM that missed her earlier and sent  msg in response to her request earlier today.  To please let clinic know it was read.  Naa Gonsales, MS, RD, RN

## 2022-08-08 NOTE — TELEPHONE ENCOUNTER
Reason for call:  Medication   If this is a refill request, has the caller requested the refill from the pharmacy already? Yes  Will the patient be using a [facility]? No  Name of the pharmacy and phone number for the current request: [facility], [phone]    Name of the medication requested: Topamax    Other request:     Phone number to reach patient:  Home number on file [phone] (home)    Best Time:      Can we leave a detailed message on this number?  YES    Travel screening: Not Applicable

## 2022-08-10 ENCOUNTER — VIRTUAL VISIT (OUTPATIENT)
Dept: SURGERY | Facility: CLINIC | Age: 57
End: 2022-08-10
Payer: COMMERCIAL

## 2022-08-10 VITALS — WEIGHT: 256 LBS | BODY MASS INDEX: 37.92 KG/M2 | HEIGHT: 69 IN

## 2022-08-10 DIAGNOSIS — E66.812 CLASS 2 SEVERE OBESITY DUE TO EXCESS CALORIES WITH SERIOUS COMORBIDITY AND BODY MASS INDEX (BMI) OF 37.0 TO 37.9 IN ADULT (H): ICD-10-CM

## 2022-08-10 DIAGNOSIS — Z98.84 BARIATRIC SURGERY STATUS: Primary | ICD-10-CM

## 2022-08-10 DIAGNOSIS — E66.01 CLASS 2 SEVERE OBESITY DUE TO EXCESS CALORIES WITH SERIOUS COMORBIDITY AND BODY MASS INDEX (BMI) OF 37.0 TO 37.9 IN ADULT (H): ICD-10-CM

## 2022-08-10 PROCEDURE — 99214 OFFICE O/P EST MOD 30 MIN: CPT | Mod: GT | Performed by: PHYSICIAN ASSISTANT

## 2022-08-10 NOTE — PROGRESS NOTES
"Laura is a 56 year old who is being evaluated via a billable video visit.      If the video visit is dropped, the invitation should be resent by: Text to cell phone: [phone]  Will anyone else be joining your video visit? No      Video-Visit Details    Type of service:  Video Visit    Video Start Time: 10:34    Video End Time: 11:02     35 minutes spent on the date of the encounter doing chart review, review of test results, patient visit and documentation       Originating Location (pt. Location): Home    Distant Location (provider location):  [facility] [place]     Platform used for Video Visit: Fobbler            August 10, 2022          Return Virtual Medical Weight Management Note       Laura Nava  MRN:  [id number]  :  1965      Dear Vicki Ruiz,    I had the pleasure of doing a visit with your patient Laura Nava. She is a 56 year old female who I am continuing to see for treatment of obesity related to:       10/2/2018   I have the following health issues associated with obesity: High Cholesterol, Weight Bearing Joint Pain       INTERVAL HISTORY:  3.5 years SP Gastric Sleeve Patient who was switched from phentermine to topamax at her last visit. Has felt like her weight went up quite a bit after stopping the phentermine.  When she started the topamax was very tired at first but not anymore. Taking 75 mg at bedtime. Also had some anxiety at first but not anymore.  Started the topamax about 6 weeks ago. Notices that she feels full really fast now.  Does feel hungry for dinner time.  Thinks her challenge is still snacking.    Does have some brain fog and the \"disconnected\" feeling but feels like it is manageable      CURRENT WEIGHT:   256 lbs 0 oz    Wt Readings from Last 4 Encounters:   08/10/22 256 lb (116.1 kg)   22 256 lb 6.4 oz (116.3 kg)   22 252 lb 1.6 oz (114.4 kg)   22 254 lb (115.2 kg)         BARIATRIC " "METRICS:  Current Weight: 256 lb (116.1 kg) (pt reported)  Body mass index is 37.8 kg/m .   Wt change since last visit (lbs): -0.4  Cumulative weight loss (lbs): 69.3        DIETARY HISTORY  Meals Per Day: 3  Food Diary:   B: [employer]  nutrigrain bar minmorning  L: dannon yogurt and cheese  D: 2 egg omlette with a piece of toast  Snacks Per Day: yes - tries to keep it to fruit  Fluids: 16 oz water, 16 oz tea, 24 oz decaf coffee         Exercise:  Went on a walking vacation.  Walking about 6 days per week for about 30 minutes.        ROS: 8/10/2022  General  Fatigue: tired initially but improving now.   Sleep Quality:  Insomnia: No  HEENT  Dry Mouth:No  Hx of glaucoma: No  Vision changes: No  Cardiovascular  Chest Pain with Exertion: No  Palpitations: No  Hx of heart disease: No  HTN: No  Pulmonary  Shortness of breath at rest: No  Shortness of breath with exertion: No  Gastrointestinal  Nausea: No  Diarrhea: No  Heartburn: No  Abdominal pain: No  Constipation: yes sometimes  Psychiatric  Moods Stable: Yes  Anxiety: No  Depression: No  Neurologic:  Hx of seizures: No  Hx of paresthesias: No  Headaches: No  Tremor: No    History of kidney stones: No  History of kidney disease: No  Current birth control: postmenopausal for a year.       MEDICATIONS:   Current Outpatient Medications   Medication     acetaminophen (TYLENOL) 325 MG tablet     Calcium Carb-Ergocalciferol (CHEWABLE CALCIUM/D PO)     childrens multivitamin w/iron (FLINTSTONES COMPLETE) 60 MG chewable tablet     omeprazole (PRILOSEC) 20 MG DR capsule     pravastatin (PRAVACHOL) 40 MG tablet     scopolamine (TRANSDERM) 1 MG/3DAYS 72 hr patch     sertraline (ZOLOFT) 50 MG tablet     topiramate (TOPAMAX) 25 MG tablet     vitamin D3 (CHOLECALCIFEROL) 2000 units tablet     No current facility-administered medications for this visit.       PE:  Ht 5' 9\" (1.753 m)   Wt 256 lb (116.1 kg)   BMI 37.80 kg/m    GENERAL: Healthy, alert and no distress  EYES: Eyes " grossly normal to inspection.  No discharge or erythema, or obvious scleral/conjunctival abnormalities.  RESP: No audible wheeze, cough, or visible cyanosis.  No visible retractions or increased work of breathing.    SKIN: Visible skin clear. No significant rash, abnormal pigmentation or lesions.  NEURO: Cranial nerves grossly intact.  Mentation and speech appropriate for age.  PSYCH: Mentation appears normal, affect normal/bright, judgement and insight intact, normal speech and appearance well-groomed.        ASSESSMENT/PLAN:   Class 2 severe obesity due to excess calories with Body Mass Index (BMI) of 37  Bariatric Surgery Status      Discussed the importance of diet and lifestyle for long term success.  Patient wants to continue the topamax as she has only been taking for 6 weeks. Does think the side effects are manageable. Will notify clinic if she decides to taper off.      Ordered lab for medication management  Discussed having a topamax 25 mg tablet with lunch and 50 mg at bedtime to help with her afternoon cravings.   Recommend patient schedule with diet.      Schedule a follow up in 6 weeks to see how she is doing with the topamax. If ok can extend appointment out other wise will discuss other medication options. She will call her insurance regarding coverage for Dov London MS, MARV          26 minutes spent on the date of the encounter doing chart review, review of test results, patient visit and documentation

## 2022-08-10 NOTE — PATIENT INSTRUCTIONS
It was great talking with you today!  Please increase your water intake to approximately 50 oz.      Please contact your insurance regarding coverage for Saxenda - a weight loss medication.         Follow up in 6 weeks      Eleonora FAIR      Eat Better ? Move More ? Live Well    Eat 3 nutrient-rich meals each day    Don t skip meals--it will cause you to overeat later in the day!    Eating fiber (vegetables/fruits/whole grains) and protein with meals helps you stay full longer    Choose foods with less than 10 grams of sugar and 5 grams of fat per serving to prevent excess calories and weight re-gain  Eat around the same times each day to develop a routine eating schedule   Avoid snacking unless physically hungry.   Planned snacks: 1-2 times per day and no more than 150 calories    Eat protein first   Protein helps with healing, maintaining adequate muscle mass, reducing hunger and optimizing nutritional status   Aim for 60-80 grams of protein per day   Fill up on Fiber   Fiber comes from plants--fruits, veggies, whole grains, nuts/seeds and beans   Fiber is low in calories, high in phytonutrients and helps you stay full longer   Aim for 25-35 grams per day by eating fiber with meals and snacks  Eat [id number]   Take 20-30 minutes to eat each meal by taking small bites, chewing foods to applesauce consistency or 20-30 times before you swallow   Eating foods too fast can delay satiety/fullness signals and increase overeating   Slow down your eating by using toddler utensils, putting your fork/spoon down between bites and not watching TV or emailing during meals!   Keep a Journal         Writing down what you eat, how you feel and when you are active helps you identify new changes to work on from week to week         Look for ways to cut 100 calories from your current diet 2-3 times per day  Drink 64 ounces of 0-Calorie drinks between meals   Water   Zero calorie Propel  or Vitamin Water     SoBe Lifewater  Zero  Calories   Crystal Light , Sugar-Free Toni-Aid , and other sugar-free lemonade or flavored christian   Keep Caffeine to less than 300mg per day ie: 3-6oz cups coffee     Work up to 45-60 minutes of physical activity most days of the week   Helps with losing weight and prevent regaining those extra pounds!    Do a combo of cardio (walking/water exercises) and strength training (lifting weights/Vinyasa yoga)    Avoid Mindless Eating   Be present when you eat--take note of the smell, taste and quality of your food   Make a list of alternative activities you could do to prevent eating out of boredom/stress  Go for a walk, call a friend, chew gum, paint your nails, re-organize the garage, etc

## 2022-09-15 ENCOUNTER — TRANSFERRED RECORDS (OUTPATIENT)
Dept: HEALTH INFORMATION MANAGEMENT | Facility: CLINIC | Age: 57
End: 2022-09-15

## 2022-09-15 LAB
CHOLESTEROL (EXTERNAL): 183 MG/DL
CREATININE (EXTERNAL): 0.8 MG/DL (ref 0.5–1.2)
GFR ESTIMATED (EXTERNAL): 82 ML/MIN/1.73
GFR ESTIMATED (IF AFRICAN AMERICAN) (EXTERNAL): 96 ML/MIN/1.73
GLUCOSE (EXTERNAL): 81 MG/DL (ref 65–99)
HDLC SERPL-MCNC: 57 MG/DL (ref 40–60)
LDL CHOLESTEROL (EXTERNAL): 108 MG/DL
POTASSIUM (EXTERNAL): 4.2 MMOL/L (ref 3.5–5)
TRIGLYCERIDES (EXTERNAL): 92 MG/DL (ref 35–160)

## 2022-09-20 ENCOUNTER — DOCUMENTATION ONLY (OUTPATIENT)
Dept: SURGERY | Facility: CLINIC | Age: 57
End: 2022-09-20

## 2022-09-21 ENCOUNTER — VIRTUAL VISIT (OUTPATIENT)
Dept: SURGERY | Facility: CLINIC | Age: 57
End: 2022-09-21
Payer: COMMERCIAL

## 2022-09-21 ENCOUNTER — TELEPHONE (OUTPATIENT)
Dept: SURGERY | Facility: CLINIC | Age: 57
End: 2022-09-21

## 2022-09-21 VITALS — WEIGHT: 254 LBS | BODY MASS INDEX: 37.62 KG/M2 | HEIGHT: 69 IN

## 2022-09-21 DIAGNOSIS — E66.812 CLASS 2 SEVERE OBESITY DUE TO EXCESS CALORIES WITH SERIOUS COMORBIDITY AND BODY MASS INDEX (BMI) OF 37.0 TO 37.9 IN ADULT (H): Primary | ICD-10-CM

## 2022-09-21 DIAGNOSIS — Z98.84 BARIATRIC SURGERY STATUS: ICD-10-CM

## 2022-09-21 DIAGNOSIS — E66.01 CLASS 2 SEVERE OBESITY DUE TO EXCESS CALORIES WITH SERIOUS COMORBIDITY AND BODY MASS INDEX (BMI) OF 37.0 TO 37.9 IN ADULT (H): Primary | ICD-10-CM

## 2022-09-21 PROCEDURE — 99214 OFFICE O/P EST MOD 30 MIN: CPT | Mod: 95 | Performed by: PHYSICIAN ASSISTANT

## 2022-09-21 NOTE — PROGRESS NOTES
Laura is a 56 year old who is being evaluated via a billable video visit.      If the video visit is dropped, the invitation should be resent by: Text to cell phone: [phone]  Will anyone else be joining your video visit? No      Video-Visit Details    Type of service:  Video Visit    Video Start Time:  3:34    Video End Time: 4:00    32  minutes spent on the date of the encounter doing chart review, review of test results, patient visit and documentation     Originating Location (pt. Location): Home    Distant Location (provider location):  [facility] [place]     Platform used for Video Visit: Evostor          2022            Return Virtual Medical Weight Management Note       Laura Nava  MRN:  [id number]  :  1965      Dear Vicki Ruiz,    I had the pleasure of doing a visit with your patient Laura Nava. She is a 56 year old female who I am continuing to see for treatment of obesity related to:       10/2/2018   I have the following health issues associated with obesity: High Cholesterol, Weight Bearing Joint Pain       INTERVAL HISTORY:  3.5 years SP Gastric Sleeve Patient. Is still on the topamax 75 mg at bedtime.  Thinks this medication makes her anxious. She is more jitteriness and this makes her want to snack more. This defeats the objective. She will eat candy or chips. It is not the mealtime but in between the meals. Work is very stressful and patient feels all wound up.  Has not really lost any more weight.    Had her lab drawn      CURRENT WEIGHT:   254 lbs 0 oz    Wt Readings from Last 4 Encounters:   22 254 lb (115.2 kg)   08/10/22 256 lb (116.1 kg)   22 256 lb 6.4 oz (116.3 kg)   22 252 lb 1.6 oz (114.4 kg)       BARIATRIC METRICS:  Current Weight: 254 lb (115.2 kg) (pt reported)  Body mass index is 37.51 kg/m .   Wt change since last visit (lbs): -2  Cumulative weight loss (lbs): 71.3        ROS:  "9/21/2022  General  Fatigue: tired initially but improving now.   Sleep Quality:  Insomnia: No  HEENT  Dry Mouth:No  Hx of glaucoma: No  Vision changes: No  Cardiovascular  Chest Pain with Exertion: No  Palpitations: No  Hx of heart disease: No  HTN: No  Pulmonary  Shortness of breath at rest: No  Shortness of breath with exertion: No  Gastrointestinal  Nausea: No  Diarrhea: No  Heartburn: No  Abdominal pain: No  Constipation: yes sometimes  No history of pancreatitis or bowel obstruction  Psychiatric  Moods Stable: Yes  Anxiety: No  Depression: No  Neurologic:  Hx of seizures: No  Hx of paresthesias: No  Headaches: No  Tremor: No    History of kidney stones: No  History of kidney disease: No  Current birth control: postmenopausal for a year.     No personal or family history of thyroid cancer      MEDICATIONS:   Current Outpatient Medications   Medication     acetaminophen (TYLENOL) 325 MG tablet     Calcium Carb-Ergocalciferol (CHEWABLE CALCIUM/D PO)     childrens multivitamin w/iron (FLINTSTONES COMPLETE) 60 MG chewable tablet     omeprazole (PRILOSEC) 20 MG DR capsule     pravastatin (PRAVACHOL) 40 MG tablet     scopolamine (TRANSDERM) 1 MG/3DAYS 72 hr patch     sertraline (ZOLOFT) 50 MG tablet     topiramate (TOPAMAX) 25 MG tablet     vitamin D3 (CHOLECALCIFEROL) 2000 units tablet     No current facility-administered medications for this visit.       PE:  Ht 5' 9\" (1.753 m)   Wt 254 lb (115.2 kg)   BMI 37.51 kg/m    GENERAL: Healthy, alert and no distress  EYES: Eyes grossly normal to inspection.  No discharge or erythema, or obvious scleral/conjunctival abnormalities.  RESP: No audible wheeze, cough, or visible cyanosis.  No visible retractions or increased work of breathing.    SKIN: Visible skin clear. No significant rash, abnormal pigmentation or lesions.  NEURO: Cranial nerves grossly intact.  Mentation and speech appropriate for age.  PSYCH: Mentation appears normal, affect normal/bright, judgement " "and insight intact, normal speech and appearance well-groomed.        ASSESSMENT/PLAN:   Class 2 severe obesity due to excess calories with Body Mass Index (BMI) of 37  Bariatric Surgery Status        We discussed the role of pharmacological agents in the treatment of obesity and the \"off-label\" use of medications in this practice. We discussed the risks and benefits of each. We discussed indications, contraindications, potential side effects, and estimated costs of each. Discussed that medications must always be used together with lifestyle changes such as improvements in diet choices, portion control and establishing and maintaining a regular exercise program.       Discussed the importance of diet and lifestyle for long term success. Also the impact of stress on weight loss  Patient to discontinue topamax. Reviewed how to taper off.     Discussed Saxenda along with side effects.  Patient information provided.        Follow up in 6-7 weeks          Eleonora London MS, PA-C              "

## 2022-09-21 NOTE — PATIENT INSTRUCTIONS
"Nice to talk with you today. Thank you for allowing me the privilege of caring for you. We hope we provided you with the excellent service you deserve.     To ensure the quality of our services you may receive a patient satisfaction survey from an independent monitoring company.  The greatest compliment you can give is \"Likely to Recommend\"    Below is our plan we discussed.-  MARV Crews        Please call [phone] and schedule a follow up with Eleonora London PA-C in 6-7 weeks.  If you need to reach me sooner you can do so by calling [phone].    Have a great day!               MEDICATION STARTED AT THIS APPOINTMENT    We are starting a GLP-1 (Glucagon-like Peptide-1) medication called Saxenda. One of the ways it works is by slowing down the rate that food leaves your stomach. You feel jo and will eat less. It also helps regulate hormones that can help improve your blood sugars.    If you are a patient that checks blood sugars, continue to check as instructed by your doctor. Low blood sugars are rare but can happen if patients are on insulin or other oral agents. If you notice consistent low sugars or high sugars, your medication may need to be adjusted after your appointment. If this is the case, please call RN and provide her your blood sugar record from the last 3-4 days. The RN will get in touch with the doctor and call you back/[employer] message with recommendations. We tolerate high sugars for a bit, so if sugars are running 180-200, this is ok. As weight starts dropping the blood sugars should too. If readings are consistently over 200 for 1-2 weeks, then you should call the doctor/nurse.    Dosing for this medication:   Week 1- Inject 0.6 mg daily  Week 2- Inject 1.2 mg daily  Week 3- Inject 1.8 mg daily  Week 4- Inject 2.4 mg daily  Week 5 and thereafter- Inject 3.0 mg daily    Side effects of GLP- Medications include: The most common side effects are all GI related and consist of: nausea, " constipation, diarrhea, burping, or gassiness. Patients are advised to eat slowly and less, and nausea typically passes if people can stick it out.     The risk of pancreatitis (inflammation of the pancreas) has been associated with this type of medication, but is very rare.  If you have had pancreatitis in the past, this medication may not be for you. Please let us know about any past history of pancreas problems.    Symptoms of pancreatitis include: Pain in your upper stomach area which may travel to your back and be worse after eating. Your stomach area may be tender to the touch.  You may have vomiting or nausea and/or have a fever. If you should develop any of these symptoms, stop the medication and contact your primary care doctor. They will do a blood test to check for pancreatitis.         There is a small chance you may have some low blood sugar after taking the medication.   The signs of low blood sugar are:  Weakness  Shaky   Hungry  Sweating  Confusion      See below for ways to treat low blood sugar without adding in lots of extra calories.      Treating Low Blood Sugar    If you have symptoms of low blood sugar (sweating, shaking, dizzy, confused) eat 15 grams of carbs and wait 15 minutes:    Glucose Tabs are best for sugars under 70 -  Dex4 or BD Glucose tablets are good, you will need to take 3-4 of these to equal 15 grams.     One small box of raisins  4 oz fruit juice box or   cup fruit juice  1 small apple  1 small banana    cup canned fruit in water    English muffin or a slice of bread with jelly   1 low fat frozen waffle with sugar-free syrup    cup cottage cheese with   cup frozen or fresh blueberries  1 cup skim or low-fat milk    cup whole grain cereal  4-6 crackers such as Triscuits      This medication is usually not covered by insurance and can be quite expensive. Sometimes a prior authorization is required, which may take up to 1-2 weeks for an insurance company to make a decision if  "they will cover the medication. Please be patient, you will be notified after a decision has been made.    Contact the nurse via [employer] or call [phone] if you have any questions or concerns. (Do not stop taking it if you don't think it's working. For some people it works without them knowing it.)     In order to get refills of this or any medication we prescribe you must be seen in the medical weight mgmt clinic every 2-4 months.        If you have access to a computer, you can get the Saxenda savings coupon by following the below information:     1. Go to this [employer] website: https://www.[employer]/saxenda/savings-card.html    2. Click on \"get your card here\"    3. Select the button next to \"I need a new card or a replacement card\"    4.  Select the button next to \"No, I am not enrolled\" regarding the \"Are you enrolled in any government, state, or federally funded medical or prescription benefit programs? (Examples include but are not limited to Medicare, Medigap, VA, DOD, , or any similar federal or state health care program.)\" question.     5. Select \"Yes\" for the \"Do you have commercial (also known as private) insurance that covers your prescription? (Example: Insurance provided through an employer)\" question.     6. Follow the prompts and fill out your information, then press \"next\".    7. The site will construct a savings coupon card for you. You will be asked if you would like the card printed, emailed, etc and select the option you prefer. You can always take a picture of the card that it shows you.     8. Bring the savings card information to your pharmacy and they will use the savings card to reduce your copay.       "

## 2022-09-21 NOTE — TELEPHONE ENCOUNTER
Prior Authorization Retail Medication Request    Medication/Dose: liraglutide - Weight Management (SAXENDA) 18 MG/3ML pen  Sig: Week 1: 0.6 mg subcutaneous daily, Week 2: 1.2 mg daily, Week 3: 1.8 mg daily, Week 4: 2.4 mg daily, Week 5 & on: 3 mg daily    ICD code (if different than what is on RX):  Bariatric surgery status [Z98.84] Previously Tried and Failed:  Patient has failed phentermine. Wegovy is not available     Rationale:  Weight management, loss    Insurance Name: [employer]  Insurance ID:  [id number]       Pharmacy Information (if different than what is on RX)  Name:  [facility] [id number] [place] [facility] [id number] [place] [address]  Phone:  [phone]

## 2022-09-26 ENCOUNTER — TELEPHONE (OUTPATIENT)
Dept: SURGERY | Facility: CLINIC | Age: 57
End: 2022-09-26

## 2022-09-26 NOTE — TELEPHONE ENCOUNTER
Patient called re: Saxenda questions:    With insurance and Saxenda savings coupon she can get Saxenda for $400.  States she gets nauseated very easily and doesn't want to spend $400 if this is likely to make her sick.     She was wondering if a smaller quantity could be ordered to reduce the cost and she could try it, or other recommendations?    Thank you,   Mariya Herring, RN

## 2022-09-27 NOTE — TELEPHONE ENCOUNTER
Left message to call back or review her [employer] messages.    Mariya Herring RN on 9/27/2022 at 2:35 PM

## 2022-09-27 NOTE — TELEPHONE ENCOUNTER
Patient called this morning -  She had called [employer] to see if she could get a sample of saxenda sent to her, they cannot send out to patients but provider offices can request samples.    Talked to Naa on this  -  Also not feasible due to medicine needing to be refrigerated.   Patient also called pharmacy and they are not able to issue just 1 pen of a box -  They have to issue an entire box per prescription.    Patient very worried about being nauseated with Saxenda, not wanting to spend $400 for prescription.  Please advise.   Thank you,   Mariya Herring, RN

## 2022-11-08 ENCOUNTER — VIRTUAL VISIT (OUTPATIENT)
Dept: SURGERY | Facility: CLINIC | Age: 57
End: 2022-11-08
Payer: COMMERCIAL

## 2022-11-08 VITALS — WEIGHT: 250 LBS | HEIGHT: 69 IN | BODY MASS INDEX: 37.03 KG/M2

## 2022-11-08 DIAGNOSIS — E66.01 CLASS 2 SEVERE OBESITY DUE TO EXCESS CALORIES WITH SERIOUS COMORBIDITY AND BODY MASS INDEX (BMI) OF 36.0 TO 36.9 IN ADULT (H): Primary | ICD-10-CM

## 2022-11-08 DIAGNOSIS — Z98.84 BARIATRIC SURGERY STATUS: ICD-10-CM

## 2022-11-08 DIAGNOSIS — E66.812 CLASS 2 SEVERE OBESITY DUE TO EXCESS CALORIES WITH SERIOUS COMORBIDITY AND BODY MASS INDEX (BMI) OF 36.0 TO 36.9 IN ADULT (H): Primary | ICD-10-CM

## 2022-11-08 PROCEDURE — 99214 OFFICE O/P EST MOD 30 MIN: CPT | Mod: 95 | Performed by: PHYSICIAN ASSISTANT

## 2022-11-08 NOTE — PATIENT INSTRUCTIONS
"Nice to talk with you today. Thank you for allowing me the privilege of caring for you. We hope we provided you with the excellent service you deserve.     To ensure the quality of our services you may receive a patient satisfaction survey from an independent monitoring company.  The greatest compliment you can give is \"Likely to Recommend\"    Below is our plan we discussed.-  MARV Crews      You should be receiving a call to schedule with Lauren Bloch PharmD for the beginning of February. You may also schedule the appointment by calling [phone] or toll-free at [phone].    Please schedule a follow up with Eleonora London PA-C for early April.  If you need to reach me sooner you can do so by calling [phone].    Have a great day!     "

## 2023-03-02 ENCOUNTER — TRANSFERRED RECORDS (OUTPATIENT)
Dept: HEALTH INFORMATION MANAGEMENT | Facility: CLINIC | Age: 58
End: 2023-03-02
Payer: COMMERCIAL

## 2023-03-07 ENCOUNTER — VIRTUAL VISIT (OUTPATIENT)
Dept: PHARMACY | Facility: CLINIC | Age: 58
End: 2023-03-07
Attending: PHYSICIAN ASSISTANT
Payer: COMMERCIAL

## 2023-03-07 ENCOUNTER — TELEPHONE (OUTPATIENT)
Dept: SURGERY | Facility: CLINIC | Age: 58
End: 2023-03-07
Payer: COMMERCIAL

## 2023-03-07 DIAGNOSIS — E66.01 CLASS 2 SEVERE OBESITY DUE TO EXCESS CALORIES WITH SERIOUS COMORBIDITY AND BODY MASS INDEX (BMI) OF 36.0 TO 36.9 IN ADULT (H): Primary | ICD-10-CM

## 2023-03-07 DIAGNOSIS — E66.812 CLASS 2 SEVERE OBESITY DUE TO EXCESS CALORIES WITH SERIOUS COMORBIDITY AND BODY MASS INDEX (BMI) OF 36.0 TO 36.9 IN ADULT (H): Primary | ICD-10-CM

## 2023-03-07 PROCEDURE — 99207 PR NO CHARGE LOS: CPT | Mod: VID | Performed by: PHARMACIST

## 2023-03-07 RX ORDER — SEMAGLUTIDE 2.4 MG/.75ML
2.4 INJECTION, SOLUTION SUBCUTANEOUS WEEKLY
Qty: 3 ML | Refills: 1 | Status: SHIPPED | OUTPATIENT
Start: 2023-03-07 | End: 2023-07-05

## 2023-03-07 RX ORDER — SEMAGLUTIDE 1.7 MG/.75ML
1.7 INJECTION, SOLUTION SUBCUTANEOUS WEEKLY
Qty: 3 ML | Refills: 0 | Status: SHIPPED | OUTPATIENT
Start: 2023-03-07 | End: 2023-08-11

## 2023-03-07 NOTE — PATIENT INSTRUCTIONS
Recommendations from today's disease management visit:                                                      After finishing up the Saxenda you have at home, change to Wegovy at 1.7 mg weekly for 4 weeks then increase to 2.4 mg weekly. RX sent into pharmacy.   If you change your mind and wish to continue Saxenda, let us know to send in refills.     To schedule another MTM appointment, please call the clinic directly or you may call the MTM scheduling line at [phone] or toll-free at [phone].     My Clinical Pharmacist's contact information:                                                      Please feel free to contact me with any questions or concerns you have.      Lauren Bloch, PharmD, BCACP   Medication Therapy Management Pharmacist   [facility] [place]

## 2023-03-07 NOTE — Clinical Note
Plan to transition from [facility] to [facility]. Romina CARSON

## 2023-03-07 NOTE — PROGRESS NOTES
Disease State Management Encounter:                          Laura Nava is a 57 year old female contacted via secure video for for an initial visit. She was referred to me from Eleonora London PA-C.  Insurance does not cover comprehensive medication review with Medication Therapy Management (MTM). Patient declines private pay. Therefore, completed one time consult today. Medication reconciliation completed today. No medication access issues.     Reason for visit: Saxenda follow up.     Obesity:   Saxenda 3 mg once daily    Followed by Eleonora London PA-C, seen 11/08/2022 for Return Medical Weight Management. Patient is experiencing the follow side effects: None. She feels that she is hungrier through the end of the day. Sleeve gastrectomy in 2019. Stopped losing weight 1 year post op and maintained for 6 months before regaining weight slowly. Tried various weight loss medication(s) at that time before starting Saxenda in September 2022. She is trying to work on her sleep as she knows that has negatively impacting her progress historically. Has lost 14 lb while on Saxenda. Currently at her lowest weight since surgery. She does feel like weight loss has slowed down considerably. Has 1 month left of Saxenda. Reports high deductible plan, paying higher copay for Saxenda as of now. Patient has no history of pancreatitis. Patient has no personal or family history of medullary thyroid carcinoma or MEN2.   Diet/Eating Habits: Patient reports breakfast: coffee + core power protein; lunch: sargentto snack (almond, cheese, dried cranberries) + dannon yogurt; Dinner: varies. Snack: popcorn, every once in a while renny martin. Her greater issues is cravings/snacking. She has remained eating smaller portions since surgery.  Exercise/Activity: Patient reports walking on treadmill 5 days per week, otherwise will walk outside when nice out.   Tried/Failed/Contraindicated Medications: Topiramate: unmanageable anxiety;  "phentermine: lost efficacy over time.     Current weight today: 240 lb   Weight When Starting Saxenda: 254 lb   Initial Consult Weight 10/2018: 325 lb 4.8 oz   Weight Day of Surgery: 318 lb  Cumulative Weight Loss: 85.3 lb, -26.2% from initial consult   Wt Readings from Last 4 Encounters:   11/08/22 250 lb (113.4 kg)   09/21/22 254 lb (115.2 kg)   08/10/22 256 lb (116.1 kg)   06/28/22 256 lb 6.4 oz (116.3 kg)     Estimated body mass index is 36.92 kg/m  as calculated from the following:    Height as of 11/8/22: 5' 9\" (1.753 m).    Weight as of 11/8/22: 250 lb (113.4 kg).    Assessment/Plan:    Due to slowing of progression of weight loss as well as hunger/cravings somewhat returning, discussed next steps from medication perspective. Options included:   1) Transition from Saxenda to Wegovy 1.7 mg weekly for 4 weeks then if tolerating then increase to 2.4 mg weekly   2) Add Naltrexone to current Saxenda RX.     Patient preferred option #1. Discussed importance of continuing dietary, behavioral changes and activity. She will finish out her last month of Saxenda then transition to Wegovy. RX sent in for both 1.7 mg weekly and 2.4 mg weekly RX. Completed teaching of administration of Wegovy. Discussed mechanism of action, side effects, warnings, and efficacy. Discussed appropriate storage and stability.      Follow-up: 1 month with Eleonora London PA-C.     I spent 30 minutes with this patient today. All changes were made via collaborative practice agreement with Eleonora London PA-C. A copy of the visit note was provided to the patient's provider(s).    A summary of these recommendations is available via clinical portal.     Lauren Bloch, PharmD, BCACP   Medication Therapy Management Pharmacist   [facility] [place]     Medication Therapy Recommendations  Class 2 severe obesity due to excess calories with serious comorbidity and body mass index (BMI) of 36.0 to 36.9 in adult (H)    Current " Medication: liraglutide - Weight Management (SAXENDA) 18 MG/3ML pen   Rationale: More effective medication available - Ineffective medication - Effectiveness   Recommendation: Change Medication - Wegovy 1.7 MG/0.75ML Soaj   Status: Accepted per CPA

## 2023-03-13 ENCOUNTER — TELEPHONE (OUTPATIENT)
Dept: SURGERY | Facility: CLINIC | Age: 58
End: 2023-03-13
Payer: COMMERCIAL

## 2023-03-13 NOTE — TELEPHONE ENCOUNTER
Questions re: switching from Saxenda to Wegovy.  Is wondering if really want to start at Wegovy 1.7 mg dose.    This was Pharmacist,  [place], [facility] [place].  She stated reps rec start at lowest dose.  Is wondering if PharmD indeed does want the 1.7 mg dose.  Read to Lindsey Lauren Bloch, PharmD's note from 3/7/23.  Also informed her that Romina is seasoned at changing these meds and is MTM with wt. loss.  Naa Gonsales, MS, RD, RN

## 2023-03-13 NOTE — TELEPHONE ENCOUNTER
Medication Question or Refill    Contacts       Type Contact Phone/Fax    03/13/2023 01:22 PM CDT Phone (Incoming) [facility] [address] [place] [facility] [id number] [place] [address] [place] (Pharmacy) [phone]          What medication are you calling about (include dose and sig)?:   WEGOVY    Preferred Pharmacy:    [facility] [address] [place] [facility] [address]  [address] [place] [id number]  Phone: [phone] Fax: [phone]      Controlled Substance Agreement on file:   CSA -- Patient Level:    CSA: None found at the patient level.       Who prescribed the medication?: Lita      Do you have any questions or concerns?  Yes:   Pharm needs to discuss 'conversion from SAXENDA to WEGOVY'

## 2023-03-14 NOTE — TELEPHONE ENCOUNTER
PA Initiation    Medication: Semaglutide-Weight Management (WEGOVY) 1.7 MG/0.75ML pen   Insurance Company: [employer] - Phone [phone] Fax [phone]  [id number] [place] [place] [facility] [address]  Filling Pharmacy Phone: [phone]  Filling Pharmacy Fax: [phone]  Start Date: 3/11/2023          
Patient informed.     Lauren Bloch, PharmD, BCACP   Medication Therapy Management Pharmacist   [facility]        
Per test claim **Wegovy** needs prior authorization.   Insurance is as follows:  BIN: [id number]  PCN: na  ID: [id number]  Group: [id number]  Please submit for pa approval. Once approved or denied, please include liaison on communication.   
Prior Authorization Approval    Authorization Effective Date: 2/9/2023  Authorization Expiration Date: 10/7/2023  Medication: Semaglutide-Weight Management (WEGOVY) 1.7 MG/0.75ML pen--APPROVED  Approved Dose/Quantity:   Reference #:     Insurance Company: [employer] - Phone [phone] Fax [phone]  Expected CoPay:       CoPay Card Available:      Foundation Assistance Needed:    Which Pharmacy is filling the prescription (Not needed for infusion/clinic administered): [facility] [address] [place] [address]  Pharmacy Notified: Yes  Patient Notified: Yes **Instructed pharmacy to notify patient when script is ready to /ship.**        
yes

## 2023-04-03 NOTE — PROGRESS NOTES
"Laura is a 57 year old who is being evaluated via a billable video visit.      The patient has been notified of following:     \"This video visit will be conducted via a call between you and your physician/provider. We have found that certain health care needs can be provided without the need for an in-person physical exam.  This service lets us provide the care you need with a video conversation.  If a prescription is necessary we can send it directly to your pharmacy.  If lab work is needed we can place an order for that and you can then stop by our lab to have the test done at a later time.    Video visits are billed at different rates depending on your insurance coverage.  Please reach out to your insurance provider with any questions.    If during the course of the call the physician/provider feels a video visit is not appropriate, you will not be charged for this service.\"    Patient has given verbal consent for Video visit? Yes    How would you like to obtain your AVS? MyChart    If the video visit is dropped, the invitation should be resent by: Text to cell phone: [phone]    Will anyone else be joining your video visit? No    I    Video-Visit Details    Type of service:  Video Visit    Video Start Time: 10:07    Video End Time: 10:22    Originating Location (pt. Location): Home    Distant Location (provider location):  [facility] [place]     Platform used for Video Visit: [facility] Medical Weight Management Note         Laura Nava  MRN:  [id number]  :  1965  INDER:  2023        Dear Vicki Ruiz MD,    I had the pleasure of seeing your patient Laura Nava. She is a 57 year old female who I am continuing to see for treatment of obesity related to:        10/2/2018     5:45 PM   --   I have the following health issues associated with obesity High Cholesterol    Weight Bearing Joint Pain           Assessment   Problem List " Items Addressed This Visit     Class 2 severe obesity due to excess calories with serious comorbidity and body mass index (BMI) of 35.0 to 35.9 in adult (H)     Stop Saxenda  Start Wegovy         Bariatric surgery status - Primary          PLAN/DISCUSSION:  Patient plans on finishing up the last Saxenda pen and starting Wegovy this weekend.    Will schedule  In 3 months for annual visit with provider and diet          SUBJECTIVE/OBJECTIVE:  Last seen by Lauren Bloch Pharm D. Patient is 4 years SP Gastric Sleeve. Is due for annual visit as well as annual labs.  Was recently switched from Saxenda to Wegovy due to stalled weight loss.  Has not started the Wegovy yet as wants to use up the remaining Saxenda 3.0 mg.  Is feeling more hungry. Is taking the injection in the AM but by 6 pm is more hungry.  Father recently passed so dealing with some stress.     Denies nausea, vomiting, abdominal pain, severe constipation, diarrhea, pancreatitis or heartburn  Reviewed recent BMP from 3/3023 WNL            4/5/2023     9:45 AM   Weight Loss Medication History Reviewed With Patient   Which weight loss medications are you currently taking on a regular basis? Saxenda (liraglutide)         Recent diet changes:  24 oz water, 14 oz tea, core power 14 oz  B: egg and cheese omelet  L: tea or decaf and core power protein drink  D: BBQ sandwich and some fruit  Snacks: not much yesterday        Recent exercise/activity changes:  Not this week but has been walking 30 minutes - 5 days weekly       CURRENT WEIGHT:   242 lbs 0 oz    Initial Weight (lbs): 325.3 lbs  Last Visits Weight: 250 lb (113.4 kg)  Cumulative weight loss (lbs): 83.3  Weight Loss Percentage: 25.61%        4/5/2023     9:45 AM   Changes and Difficulties   I have made the following changes to my diet since my last visit: no changes   With regards to my diet, I am still struggling with: snacking - sweets   I have made the following changes to my activity/exercise since my  "last visit: no changes   With regards to my activity/exercise, I am still struggling with: motivation         MEDICATIONS:   Current Outpatient Medications   Medication Sig Dispense Refill     childrens multivitamin w/iron (FLINTSTONES COMPLETE) 60 MG chewable tablet Take 2 chew tab by mouth daily        insulin pen needle (31G X 5 MM) 31G X 5 MM miscellaneous Use 1 pen needles daily or as directed. 100 each 1     liraglutide - Weight Management (SAXENDA) 18 MG/3ML pen Inject 1.8 mg subcutaneously X 1 week, then increase to 2.4 mg subcutaneously daily X 1 week and then go up to 3.0 mg subcutaneously daily thereafter. 45 mL 0     omeprazole (PRILOSEC) 20 MG DR capsule Take 1 capsule (20 mg) by mouth daily 90 capsule 1     pravastatin (PRAVACHOL) 20 MG tablet Take 20 mg by mouth every evening       scopolamine (TRANSDERM) 1 MG/3DAYS 72 hr patch Place 1 patch onto the skin every 72 hours as needed for nausea or vomiting 1 patch 1     sertraline (ZOLOFT) 50 MG tablet Take 50 mg by mouth daily       acetaminophen (TYLENOL) 325 MG tablet Take 2 tablets (650 mg) by mouth every 4 hours as needed for pain (Patient not taking: Reported on 4/5/2023) 40 tablet 1     Semaglutide-Weight Management (WEGOVY) 1.7 MG/0.75ML pen Inject 1.7 mg Subcutaneous once a week (Patient not taking: Reported on 4/5/2023) 3 mL 0     Semaglutide-Weight Management (WEGOVY) 2.4 MG/0.75ML pen Inject 2.4 mg Subcutaneous once a week (Patient not taking: Reported on 4/5/2023) 3 mL 1           PHYSICAL EXAM:  Objective    Ht 5' 9\" (1.753 m)   Wt 242 lb (109.8 kg)   BMI 35.74 kg/m      GENERAL: Healthy, alert and no distress  EYES: Eyes grossly normal to inspection.  No discharge or erythema, or obvious scleral/conjunctival abnormalities.  RESP: No audible wheeze, cough, or visible cyanosis.  No visible retractions or increased work of breathing.    SKIN: Visible skin clear. No significant rash, abnormal pigmentation or lesions.  NEURO: Cranial nerves " grossly intact.  Mentation and speech appropriate for age.  PSYCH: Mentation appears normal, affect normal/bright, judgement and insight intact, normal speech and appearance well-groomed.        Sincerely,    Eleonora London PA-C    20 minutes spent on the date of the encounter doing chart review, history and exam, result review, counseling, developing plan of care, documentation, and further activities as noted

## 2023-04-05 ENCOUNTER — VIRTUAL VISIT (OUTPATIENT)
Dept: SURGERY | Facility: CLINIC | Age: 58
End: 2023-04-05
Payer: COMMERCIAL

## 2023-04-05 VITALS — WEIGHT: 242 LBS | BODY MASS INDEX: 35.84 KG/M2 | HEIGHT: 69 IN

## 2023-04-05 DIAGNOSIS — Z98.84 BARIATRIC SURGERY STATUS: ICD-10-CM

## 2023-04-05 DIAGNOSIS — E66.812 CLASS 2 SEVERE OBESITY DUE TO EXCESS CALORIES WITH SERIOUS COMORBIDITY AND BODY MASS INDEX (BMI) OF 35.0 TO 35.9 IN ADULT (H): Primary | ICD-10-CM

## 2023-04-05 DIAGNOSIS — E66.01 CLASS 2 SEVERE OBESITY DUE TO EXCESS CALORIES WITH SERIOUS COMORBIDITY AND BODY MASS INDEX (BMI) OF 35.0 TO 35.9 IN ADULT (H): Primary | ICD-10-CM

## 2023-04-05 PROCEDURE — 99213 OFFICE O/P EST LOW 20 MIN: CPT | Mod: VID | Performed by: PHYSICIAN ASSISTANT

## 2023-04-05 NOTE — PATIENT INSTRUCTIONS
"Nice to talk with you today. Thank you for allowing me the privilege of caring for you. We hope we provided you with the excellent service you deserve.     To ensure the quality of our services you may receive a patient satisfaction survey from an independent monitoring company.  The greatest compliment you can give is \"Likely to Recommend\"    Below is our plan we discussed.-  MARV Crews        Please call [phone] and schedule a follow up with Eleonora London PA-C in 3 months for Annual with provider and diet.  If you need to reach me sooner you can do so by calling [phone].    Have a great day!     "

## 2023-04-10 NOTE — TELEPHONE ENCOUNTER
Pt has refill available. JUANA Jacome to call pt to let her know.    Nida Lemons RN     [Very Good] : ~his/her~ current health as very good [Good] : ~his/her~  mood as  good [Intercurrent hospitalizations] : was admitted to the hospital  [No] : In the past 12 months have you used drugs other than those required for medical reasons? No [No falls in past year] : Patient reported no falls in the past year [None] : None [With Family] : lives with family [# of Members in Household ___] :  household currently consist of [unfilled] member(s) [Employed] : employed [] :  [Feels Safe at Home] : Feels safe at home [Fully functional (bathing, dressing, toileting, transferring, walking, feeding)] : Fully functional (bathing, dressing, toileting, transferring, walking, feeding) [Fully functional (using the telephone, shopping, preparing meals, housekeeping, doing laundry, using] : Fully functional and needs no help or supervision to perform IADLs (using the telephone, shopping, preparing meals, housekeeping, doing laundry, using transportation, managing medications and managing finances) [Smoke Detector] : smoke detector [Carbon Monoxide Detector] : carbon monoxide detector [Seat Belt] :  uses seat belt [Name: ___] : Health Care Proxy's Name: [unfilled]  [Relationship: ___] : Relationship: [unfilled] [I will adhere to the patient's wishes.] : I will adhere to the patient's wishes. [Never] : Never [FreeTextEntry1] : cancer [de-identified] : 6/22 foot infection [de-identified] : Podiatry,op [de-identified] : active [de-identified] : healthy [Change in mental status noted] : No change in mental status noted [Reports changes in hearing] : Reports no changes in hearing [Reports changes in vision] : Reports no changes in vision [Reports changes in dental health] : Reports no changes in dental health [ColonoscopyComments] : cologuard ordered [AdvancecareDate] : 04/23

## 2023-04-22 ENCOUNTER — HEALTH MAINTENANCE LETTER (OUTPATIENT)
Age: 58
End: 2023-04-22

## 2023-06-14 NOTE — ADDENDUM NOTE
Addended by: SUMAN HANLEY on: 4/19/2023 02:36 PM     Modules accepted: Orders     Patient/Caregiver provided printed discharge information.

## 2023-07-05 DIAGNOSIS — E66.812 CLASS 2 SEVERE OBESITY DUE TO EXCESS CALORIES WITH SERIOUS COMORBIDITY AND BODY MASS INDEX (BMI) OF 36.0 TO 36.9 IN ADULT (H): ICD-10-CM

## 2023-07-05 DIAGNOSIS — E66.01 CLASS 2 SEVERE OBESITY DUE TO EXCESS CALORIES WITH SERIOUS COMORBIDITY AND BODY MASS INDEX (BMI) OF 36.0 TO 36.9 IN ADULT (H): ICD-10-CM

## 2023-07-05 RX ORDER — SEMAGLUTIDE 2.4 MG/.75ML
2.4 INJECTION, SOLUTION SUBCUTANEOUS WEEKLY
Qty: 3 ML | Refills: 1 | Status: SHIPPED | OUTPATIENT
Start: 2023-07-05 | End: 2023-08-11

## 2023-08-09 NOTE — PROGRESS NOTES
"NUTRITION POST OP APPOINTMENT  DATE OF VISIT: August 9, 2023    Laura Nava  1965  female  [id number]  57 year old     ASSESSMENT:    REASON FOR VISIT:  Laura is a 57 year old year old female presents today for 4.5 year PO nutrition follow-up appointment. Patient is accompanied by self.    DIAGNOSIS:  Status post gastric sleeve surgery.  Obesity Obesity Grade I BMI 30-34.9     ANTHROPOMETRICS:  Initial Weight: 325.3 lb   Height:  69\"  Current Weight: 234.1 lb    BMI: 34.56 kg/(m^2).    VITAMINS AND MINERALS:   2 Multivitamin with Minerals-Turner's with iron (AM)  650 mg Calcium With Vitamin D -Viactiv (BID - afternoon and evening)   International units Vitamin D-stopped 6 months ago  1000 mcg Vitamin B-12 sublingual-stopped 6 months ago  1 Vitamin B Complex; will switch to Thiamine-stopped 6 months ago   No iron needed per clinic guidelines  1 probiotic    MEDICATIONS FOR WEIGHT LOSS:  Wegovy    NUTRITION HISTORY:  Breakfast: Nutrigrain bar  Lunch: pasta salad with tomato, cucumber peppers, onion, zucchini or turkey sandwich on dinner roll or crackers/cheese/ nuts (sargento)  Supper: 3/4-1 cup chicken lo mein or ~3 oz meat, cooked veggies, fruit  Snacks: 0-1 X per day tried peanut brittle  Fluids consumed: 64 oz water or Hint water decaf coffee, Core Power protein drink  Consuming liquid calories: Yes  Protein intake: 30-50 grams/day  Tolerate regular texture food: Yes  Any foods not tolerated details: Yes  If any food not tolerated: pasta, bread  Portion size: 1/2-1 cup  Take 20-30 minutes to consume each meal: Yes   Eat protein foods first: No  Fluids and meals separate by at least 30 minutes: Yes  Chew foods thoroughly: Yes  Tolerating diet: Yes  Drinking high protein supplements: Yes  Consuming snacks per day: 0-1  Additional Information: Currently at lowest weight since surgery. Hunger and snacking were biggest issues before starting Wegovy. He feels like her hunger hormones are well " controlled. Losing 1-2 pound per week.        PHYSICAL ACTIVITY:  Type: walks  Frequency (days per week): 5-6  Duration (min): 30    DIAGNOSIS:  Previous Nutrition Diagnosis: Altered gastrointestinal function related to alteration in gastrointestinal structure as evidenced by history of gastric sleeve surgery.- no change    Previous goals:  Have 1 cup portions containing 3 food groups per meal-met/ not met  Have solid food at breakfast and use protein drink between meals as snack-met  Aim for 600-800 kcals-n/a       Current Nutrition Diagnosis: Altered gastrointestinal function related to alteration in gastrointestinal structure as evidenced by history of gastric sleeve surgery.    INTERVENTION:   Nutrition Prescription: Eat 3 meals a day at regular intervals. Consume 60-90 grams of protein daily. Follow post-surgical vitamin and mineral protocol. Provided: Keeping Up Your Diet after Weight Loss surgery.  Assessed learning needs and learning preferences.    GOALS:  Eat at least 60 grams protein per day/ have protein at each meal   Schedule strength training (video) 1X per week  Restart: multivitamin/ mineral that meets clinic guidelines (generic children's chewable complete), vitamin D, Vitamin B-12 or switch to a Bariatric vitamin/ mineral (offered suggestion) + calcium + vitamin B-12.      Implementation: Discussed progress toward previous goals; reinforced importance of following bariatric lifestyle changes.    NUTRITION MONITORING AND EVALUATION:  Anticipated compliance: fair-good  Verbalized good understanding.    Follow up: Patient to follow up in 6 months ( at 5 year annual visit)    TIME SPENT WITH PATIENT:  36 minutes  Mandeep Blas RD, KEE  [facility]

## 2023-08-10 NOTE — PROGRESS NOTES
Return Bariatric Surgery Note      RE: Laura Nava  MR#: [id number]  : 1965  VISIT DATE: Aug 11, 2023      Dear Vicki Ruiz,    I had the pleasure of seeing your patient, Laura Nava, in my post-bariatric surgery assessment clinic.      CHIEF COMPLAINT: Post-bariatric surgery follow-up. Has also been seen in the [facility] program and started on Wegovy.  At lowest weight since surgery. Injecting 2.4 Wegovy on Sundays. Suppressing appetite.     Denies nausea, vomiting, abdominal pain, severe constipation, diarrhea, or heartburn  Does get some diarrhea and constipation. Controlled with diet. Did have some flank pain last week for several days. Has resolved now.  Gets in about 64 oz water daily      HISTORY OF PRESENT ILLNESS:      2023     4:28 PM   Questions Regarding Prior Weight Loss Surgery Reviewed With Patient   I had the following weight loss procedure Sleeve Gastrectomy   What year was your surgery? 2019   How has your weight changed since your last visit? I have lost weight   Do you currently have any of the following Heartburn, acid reflux, or GERD (acid reflux disease)?    Hyperlipidemia (high cholesterol, triglycerides)?   Do you have any concerns today? Diarrhea         Patient is taking the following bariatric postoperative vitamins:  1 Multivitamin with Minerals (AM)  650 mg Calcium With Vitamin D (BID - afternoon and evening)   No Vitamin D for about 6 months  No vitamin B12 for 6 months  No iron per protocol      Weight History:      2023     4:28 PM   --   What is your highest lifetime weight? 325   What is your lowest weight since surgery? (In pounds) 232            2023     4:28 PM   Questions Regarding Co-Morbidities and Health Concerns Reviewed With Patient   Pre-diabetes Never   Diabetes II Never   High Blood Pressure Never   High cholesterol Improved   Heartburn/Reflux Worsened   Sleep apnea Never   PCOS Never   Back pain Never   Joint pain Gone away   Lower leg  swelling Gone away     Taking omeprazole 20 mg once daily for years. Has not tried trial off in a while          8/7/2023     4:28 PM   Eating Habits   How many meals do you eat per day? 3   Do you snack between meals? Yes   How much food are you eating at each meal? 1/2 cup to 1 cup   Are you able to separate your meals and liquids by at least 30 minutes? Yes   Are you able to avoid liquid calories? Yes           8/7/2023     4:28 PM   Exercise Questions Reviewed With Patient   How often do you exercise? 5 to 6 times per week   What is the duration of your exercise (in minutes)? 30 Minutes   What types of exercise do you do? walking   What keeps you from being more active? Lack of Time       Social History:      8/7/2023     4:28 PM   --   Are you smoking? No   Are you drinking alcohol? No     1 cup of decaf coffee daily       Medications:  Current Outpatient Medications   Medication    childrens multivitamin w/iron (FLINTSTONES COMPLETE) 60 MG chewable tablet    insulin pen needle (31G X 5 MM) 31G X 5 MM miscellaneous    liraglutide - Weight Management (SAXENDA) 18 MG/3ML pen    omeprazole (PRILOSEC) 20 MG DR capsule    pravastatin (PRAVACHOL) 20 MG tablet    scopolamine (TRANSDERM) 1 MG/3DAYS 72 hr patch    Semaglutide-Weight Management (WEGOVY) 2.4 MG/0.75ML pen    sertraline (ZOLOFT) 50 MG tablet     No current facility-administered medications for this visit.         8/7/2023     4:28 PM   --   Do you avoid NSAIDs such as (Ibuprofen, Aleve, Naproxen, Advil)? Yes       ROS:  GI:       8/7/2023     4:28 PM   --   Vomiting No   Diarrhea Yes   Constipation Yes   Swallowing trouble No   Abdominal pain Yes   Heartburn No     Last week only - believes related to constipation     Skin:       8/7/2023     4:28 PM   BAR RBS ROS - SKIN   Rash in skin folds No     Psych:       8/7/2023     4:28 PM   --   Depression No   Anxiety No     :       8/7/2023     4:28 PM   BAR RBS ROS -    Female only Post-menopausal   Stress  "urinary incontinence No       LABS/IMAGING/MEDICAL RECORDS REVIEW: Epi    PHYSICAL EXAMINATION:  /54 (BP Location: Left arm, Patient Position: Sitting, Cuff Size: Adult Large)   Pulse 56   Resp 20   Ht 5' 9\" (1.753 m)   SpO2 98%   BMI 35.74 kg/m     GENERAL: Alert and oriented x3. NAD  HEART: No murmurs, rubs or gallops, Regular rate and rhythm  LUNGS: Breathing unlabored. Lung sounds clear to auscultation bilaterally  ABDOMEN: No hernias, Incisions well healed, soft and nontender  EXTREMITIES: No LE edema bilaterally  SKIN: Warm and dry. No intertriginous irritation or rash      ASSESSMENT AND PLAN:      1. 4.5 years status laparoscopic gastric sleeve    We reviewed the important post op bariatric recommendations:  eating 3 meals daily  eating protein first, getting >60gm protein daily  eating slowly, chewing food well  avoiding/limiting calorie containing beverages  avoiding fluids 30 minutes before, during, and after meals  limiting restaurant or cafeteria eating to twice a week or less  Pt reminded to avoid marginal ulcers she should avoid tobacco at all, alcohol in excess, caffeine, and NSAIDS (unless indicated for cardioprotection or othewise and opposed by a PPI).  Pt encouraged to establish and maintain a consistent physical activity routine, 6-8 hours of restorative sleep each night and optimal stress management.  Pt counseled on the importance of life long vitamin supplementation and life long follow up.     2. Morbid Obesity improved. current BMI: Body mass index is 35.74 kg/m .   Patient will continue Wegovy     3. Post surgical malabsorption:   Labs ordered per protocol.   Follow food plan per dietitian recommendations.   Continue taking recommended post-op vitamins.  4. Return to clinic in 3 months for a medication check.      Sincerely,    Eleonora London PA-C      22 minutes spent on the date of the encounter doing chart review, review of test results, patient visit and documentation     "

## 2023-08-11 ENCOUNTER — OFFICE VISIT (OUTPATIENT)
Dept: SURGERY | Facility: CLINIC | Age: 58
End: 2023-08-11
Payer: COMMERCIAL

## 2023-08-11 VITALS
HEART RATE: 56 BPM | RESPIRATION RATE: 20 BRPM | OXYGEN SATURATION: 98 % | BODY MASS INDEX: 35.74 KG/M2 | HEIGHT: 69 IN | SYSTOLIC BLOOD PRESSURE: 102 MMHG | DIASTOLIC BLOOD PRESSURE: 54 MMHG

## 2023-08-11 DIAGNOSIS — K91.2 POSTSURGICAL MALABSORPTION: ICD-10-CM

## 2023-08-11 DIAGNOSIS — E66.811 CLASS 1 OBESITY DUE TO EXCESS CALORIES WITH SERIOUS COMORBIDITY AND BODY MASS INDEX (BMI) OF 34.0 TO 34.9 IN ADULT: Primary | ICD-10-CM

## 2023-08-11 DIAGNOSIS — K21.9 GERD WITHOUT ESOPHAGITIS: ICD-10-CM

## 2023-08-11 DIAGNOSIS — E66.09 CLASS 1 OBESITY DUE TO EXCESS CALORIES WITH SERIOUS COMORBIDITY AND BODY MASS INDEX (BMI) OF 34.0 TO 34.9 IN ADULT: Primary | ICD-10-CM

## 2023-08-11 DIAGNOSIS — Z98.84 BARIATRIC SURGERY STATUS: ICD-10-CM

## 2023-08-11 PROCEDURE — 97803 MED NUTRITION INDIV SUBSEQ: CPT

## 2023-08-11 PROCEDURE — 99213 OFFICE O/P EST LOW 20 MIN: CPT | Performed by: PHYSICIAN ASSISTANT

## 2023-08-11 RX ORDER — SEMAGLUTIDE 2.4 MG/.75ML
2.4 INJECTION, SOLUTION SUBCUTANEOUS WEEKLY
Qty: 9 ML | Refills: 0 | Status: SHIPPED | OUTPATIENT
Start: 2023-08-11 | End: 2023-10-19

## 2023-08-11 NOTE — PATIENT INSTRUCTIONS
Vicente Sanchez-   It was great to visit with you and learn about  your progress. Below are the goals we discussed.  GOALS:  Eat at least 60 grams protein per day/ have protein at each meal   Schedule strength training (video) 1X per week  Restart: multivitamin/ mineral that meets clinic guidelines (generic children's chewable complete), vitamin D, Vitamin B-12 or switch to a Bariatric vitamin/ mineral (offered suggestion) + calcium + vitamin B-12.    Your next visit can be in about 6 months  per out discussion.  Thanks!  Mandeep Blas RD, LD  [facility]

## 2023-08-11 NOTE — PATIENT INSTRUCTIONS
"To ensure the quality you may receive a patient satisfaction survey. The greatest compliment you can give is \"Likely to Recommend\"    Nice to talk with you today. Thank you for allowing me the privilege of caring for you. Below is the plan discussed.-  . Eleonora London PA-C      Plan:  Labs ordered. Call [phone] to schedule.  Continue your bariatric vitamins       FOLLOW-UP:  Call [phone] to schedule next visit.       Bariatric Post Op Guidelines  General:    To avoid marginal ulcers avoid all forms of tobacco, alcohol in excess, caffeine, and NSAIDS     Exercise is key for weight loss and weight maintenance. Aim for 30-60 minutes of physical activity most days.  Include cardiovascular and strength training.    Continue lifelong vitamins supplementation and annual lab follow up.  All  patients should supplement with the following bariatric postoperative vitamins:  2 Complete multivitamins with minerals (at different times than calcium)  Vitamin D 5000 Int Units/125 mg daily   Calcium 600 mg twice daily or 500 mg three times daily   Vitamin B12: 500 mcg sl daily or 1000 mcg Inj monthly  B complex daily or Thiamine 100 mg weekly  1 Iron/Vit C. Daily for females who menstruate and/or as directed    The bariatric team should be aware and evaluate all GI symptoms which can be a sign of complications. Inability to tolerate textured solid food (chicken, steak, fish) may need to be evaluated by endoscopy.    There is a 10% increase of Alcohol Use Disorder in patients with bariatric surgery.   Most often occurring around 2 years post op.  Call if you feel alcohol is interfering in your daily life.  We can help.     Follow up annually lifelong. Obesity is a chronic disease.  Weight gain can be expected. The goal of follow-up visits is to ensure adequate vitamin and protein absorption, evaluate food intake behavior, review exercise/activity level, and assist with weight regain.    Nutritional:  Eat 3 meals per day  " (No snacks between meals.)  Do not skip meals.  This can cause overeating at the next meal and will prevent adequate protein and nutritional intake.    Aim for 60-80 grams of protein per day.  Always eat your protein first. This assists with optimal nutrition and helps you stay full longer.    Eat your protein first, and then follow with fiber.    Add fiber by including fruits, vegetables, whole grains, and beans.     Portions should be about 1 cup per meal. Use measuring cups to be accurate.  Continue to use saucer/salad plates, infant/toddler silverware to keep portion sizes small and take small bites.    Eat S-L-O-W-L-Y to make each meal last 20-30 minutes. Always stop eating when satisfied.    Aim for 64 oz. of calorie-free fluids daily.    Avoid drinking 30 min before, during, and 30 min after meal    Avoid high sugar and high fat foods to prevent high calorie intake. This will reduce your rate of weight loss and can cause weight regain.   Check nutrition labels for less than 10 grams of sugar and less than 10 grams of fat per serving.

## 2023-09-29 ENCOUNTER — TELEPHONE (OUTPATIENT)
Dept: SURGERY | Facility: CLINIC | Age: 58
End: 2023-09-29
Payer: COMMERCIAL

## 2023-09-29 NOTE — TELEPHONE ENCOUNTER
PA Initiation    Medication: WEGOVY 2.4 MG/0.75ML SC SOAJ  Insurance Company: [employer]-Specialty PA's - Phone [phone] Fax [phone]  Pharmacy Filling the Rx:    Filling Pharmacy Phone:    Filling Pharmacy Fax:    Start Date: 9/29/2023

## 2023-10-02 NOTE — TELEPHONE ENCOUNTER
Prior Authorization Approval    Medication: WEGOVY 2.4 MG/0.75ML SC SOAJ  Authorization Effective Date: 8/30/2023  Authorization Expiration Date: 9/28/2024  Approved Dose/Quantity: 3  Reference #: [id number]   Insurance Company: [employer] Non-Specialty PA's - Phone [phone] Fax [phone]  Expected CoPay: $    CoPay Card Available:      Financial Assistance Needed:    Which Pharmacy is filling the prescription:    Pharmacy Notified:    Patient Notified:       Message from plan: CaseId:[id number];Status:Approved;Review Type:Prior Auth;Coverage Start Date:08/30/2023;Coverage End Date:09/28/2024;

## 2023-10-17 ENCOUNTER — TRANSFERRED RECORDS (OUTPATIENT)
Dept: HEALTH INFORMATION MANAGEMENT | Facility: CLINIC | Age: 58
End: 2023-10-17

## 2023-10-18 ENCOUNTER — TELEPHONE (OUTPATIENT)
Dept: SURGERY | Facility: CLINIC | Age: 58
End: 2023-10-18
Payer: COMMERCIAL

## 2023-10-18 DIAGNOSIS — E66.812 CLASS 2 SEVERE OBESITY DUE TO EXCESS CALORIES WITH SERIOUS COMORBIDITY AND BODY MASS INDEX (BMI) OF 35.0 TO 35.9 IN ADULT (H): Primary | ICD-10-CM

## 2023-10-18 DIAGNOSIS — E66.01 CLASS 2 SEVERE OBESITY DUE TO EXCESS CALORIES WITH SERIOUS COMORBIDITY AND BODY MASS INDEX (BMI) OF 35.0 TO 35.9 IN ADULT (H): Primary | ICD-10-CM

## 2023-10-18 DIAGNOSIS — E66.01 MORBID (SEVERE) OBESITY DUE TO EXCESS CALORIES (H): ICD-10-CM

## 2023-10-18 RX ORDER — SEMAGLUTIDE 2.4 MG/.75ML
2.4 INJECTION, SOLUTION SUBCUTANEOUS WEEKLY
OUTPATIENT
Start: 2023-10-18

## 2023-10-18 NOTE — TELEPHONE ENCOUNTER
Medication Question or Refill    Contacts         Type Contact Phone/Fax    10/18/2023 04:35 PM CDT Phone (Incoming) Laura Nava (Self) [phone] (H)     Medication renewal            What medication are you calling about (include dose and sig)?: Semaglutide-Weight Management (WEGOVY) 2.4 MG/0.75ML pen    Preferred Pharmacy:     [facility] [id number] [place] [facility] - [address] [address]  Phone: [phone] Fax: [phone]      Controlled Substance Agreement on file:   CSA -- Patient Level:    CSA: None found at the patient level.       Who prescribed the medication?: Eleonora London    Do you need a refill? Yes    When did you use the medication last? 10/15/23    Patient offered an appointment? No    Do you have any questions or concerns?  No      Could we send this information to you in [facility] or would you prefer to receive a phone call?:   Patient would prefer a phone call   Okay to leave a detailed message?: Yes at Cell number on file:    Telephone Information:   Mobile [phone]

## 2023-10-18 NOTE — TELEPHONE ENCOUNTER
Pt received 3 months of Wegovy 2.4 8/11/23.  Not needed - too early.  Will decline per clinic protocol.   Naa Gonsales, MS, RD, RN

## 2023-10-19 RX ORDER — SEMAGLUTIDE 2.4 MG/.75ML
2.4 INJECTION, SOLUTION SUBCUTANEOUS WEEKLY
Qty: 3 ML | Refills: 0 | Status: SHIPPED | OUTPATIENT
Start: 2023-10-19 | End: 2023-11-16

## 2023-10-19 NOTE — TELEPHONE ENCOUNTER
Pt last seen 8/11  Next appt 11/28  Will refill 1 month of Wegovy 2.4 mg per nursing protocol.    Danna BARONE RN

## 2023-11-16 ENCOUNTER — TELEPHONE (OUTPATIENT)
Dept: SURGERY | Facility: CLINIC | Age: 58
End: 2023-11-16
Payer: COMMERCIAL

## 2023-11-16 DIAGNOSIS — E66.01 CLASS 2 SEVERE OBESITY DUE TO EXCESS CALORIES WITH SERIOUS COMORBIDITY AND BODY MASS INDEX (BMI) OF 35.0 TO 35.9 IN ADULT (H): ICD-10-CM

## 2023-11-16 DIAGNOSIS — E66.812 CLASS 2 SEVERE OBESITY DUE TO EXCESS CALORIES WITH SERIOUS COMORBIDITY AND BODY MASS INDEX (BMI) OF 35.0 TO 35.9 IN ADULT (H): ICD-10-CM

## 2023-11-16 RX ORDER — SEMAGLUTIDE 2.4 MG/.75ML
2.4 INJECTION, SOLUTION SUBCUTANEOUS WEEKLY
Qty: 3 ML | Refills: 0 | Status: SHIPPED | OUTPATIENT
Start: 2023-11-16 | End: 2023-11-28

## 2023-11-16 NOTE — TELEPHONE ENCOUNTER
Pt called re: Wegovy 2.4 mg refill.  Received refill for 1 month 10/19/23.  Just picked it up and would like another 2 month's worth to = 3 months.  Explained to pt that we filled to get her to next appt 11/28/23.  Pt stated would still like to get to more months dispensed now d/t will cost her more money next year.  Is worried it won't get to insurance before end of year.  Informed pt would forward to provider in clinic and get back to pt if done.  Patient verbalized understanding and is agreeable to plan.  Naa Gonsales, MS, RD, RN

## 2023-11-27 NOTE — PROGRESS NOTES
"Laura is a 58 year old who is being evaluated via a billable video visit.      The patient has been notified of following:     \"This video visit will be conducted via a call between you and your physician/provider. We have found that certain health care needs can be provided without the need for an in-person physical exam.  This service lets us provide the care you need with a video conversation.  If a prescription is necessary we can send it directly to your pharmacy.  If lab work is needed we can place an order for that and you can then stop by our lab to have the test done at a later time.    Video visits are billed at different rates depending on your insurance coverage.  Please reach out to your insurance provider with any questions.    If during the course of the call the physician/provider feels a video visit is not appropriate, you will not be charged for this service.\"    Patient has given verbal consent for Video visit? Yes    How would you like to obtain your AVS? MyChart    If the video visit is dropped, the invitation should be resent by: MY CHART    Will anyone else be joining your video visit? No    I    Video-Visit Details    Type of service:  Video Visit    Video Start Time: 10:02 AM    Video End Time:10:29 AM    Originating Location (pt. Location): Other Work    Distant Location (provider location):  [facility] [place]     Platform used for Video Visit: [facility] Medical Weight Management Note         Laura Nava  MRN:  [id number]  :  1965  INDER:  2023        Dear Vicki Ruiz MD,    I had the pleasure of seeing your patient Laura Nava. She is a 58 year old female who I am continuing to see for treatment of obesity related to:        10/2/2018     5:45 PM   --   I have the following health issues associated with obesity High Cholesterol    Weight Bearing Joint Pain           Assessment   Problem List Items " Addressed This Visit       Class 1 obesity due to excess calories with serious comorbidity and body mass index (BMI) of 33.0 to 33.9 in adult - Primary     Continue Wegovy 2.4 mg         Relevant Medications    Semaglutide-Weight Management (WEGOVY) 2.4 MG/0.75ML pen    GERD without esophagitis     Other Visit Diagnoses       Class 2 severe obesity due to excess calories with serious comorbidity and body mass index (BMI) of 35.0 to 35.9 in adult (H)        Relevant Medications    Semaglutide-Weight Management (WEGOVY) 2.4 MG/0.75ML pen               PLAN/DISCUSSION:  Congratulated patient on overall weight loss. Will continue wegovy.      Continue wegovy 2.4 mg  Upload labs       FOLLOW-UP:  3 and 6 months      SUBJECTIVE/OBJECTIVE:  SP Gastric sleeve patient being seen today for a medication check. Last seen 8/11/2023 for bariatric visit. Labs done at outside clinic. Currently taking wegovy 2.4 mg     Had a good Thanksgiving holiday - brought dinner to mothers's house who lives up [place]. Did well with portions.    Has continued to lose weight. But is up 3 lbs from her lowest. Admits to grazing a bit more. Not hungry but food is good. More so in the evening. This is the first time she has noticed with wegovy. But has been busy cooking and tired.      Tolerating the wegovy just fine but does have diarrhea. This has been the case all along for 3-4 days after each injection. Is going to [facility] in March and might have to stop the injection due to this.  Had a UTI last month and was treated with antibiotics. Had blood in urine and will be seeing urologist tomorrow. Unable to view but had BMP drawn as well. Patient reports Cr=0.6 and . WNL.   48-64 oz water daily.      Anti-obesity medications:   Current: Wegovy 2.4 mg on Sundays     Side effects:  Denies nausea, vomiting, abdominal pain, severe constipation, d or heartburn          11/27/2023    11:44 AM   Weight Loss Medication History Reviewed With Patient    Which weight loss medications are you currently taking on a regular basis? Wegovy   Are you having any side effects from the weight loss medication that we have prescribed you? No         Recent diet changes: 8 cups of water  Having 3 meals daily. Grazing a bit    Recent exercise/activity changes: walks 30 minutes daily.       CURRENT WEIGHT:   225 lbs 0 oz    Initial Weight (lbs): 325.3 lbs     Cumulative weight loss (lbs): 100.3  Weight Loss Percentage: 30.83%        11/27/2023    11:44 AM   Changes and Difficulties   I have made the following changes to my diet since my last visit: trying to drink more water   With regards to my diet, I am still struggling with: drinking enough water   I have made the following changes to my activity/exercise since my last visit: no changes   With regards to my activity/exercise, I am still struggling with: motivation         MEDICATIONS:   Current Outpatient Medications   Medication Sig Dispense Refill    childrens multivitamin w/iron (FLINTSTONES COMPLETE) 60 MG chewable tablet Take 2 chew tab by mouth daily       insulin pen needle (31G X 5 MM) 31G X 5 MM miscellaneous Use 1 pen needles daily or as directed. 100 each 1    omeprazole (PRILOSEC) 20 MG DR capsule Take 1 capsule (20 mg) by mouth daily 90 capsule 1    pravastatin (PRAVACHOL) 20 MG tablet Take 20 mg by mouth every evening      scopolamine (TRANSDERM) 1 MG/3DAYS 72 hr patch Place 1 patch onto the skin every 72 hours as needed for nausea or vomiting 1 patch 1    Semaglutide-Weight Management (WEGOVY) 2.4 MG/0.75ML pen Inject 2.4 mg Subcutaneous once a week 9 mL 0    sertraline (ZOLOFT) 50 MG tablet Take 50 mg by mouth daily      liraglutide - Weight Management (SAXENDA) 18 MG/3ML pen Inject 1.8 mg subcutaneously X 1 week, then increase to 2.4 mg subcutaneously daily X 1 week and then go up to 3.0 mg subcutaneously daily thereafter. 45 mL 0         ROS:  General  Fatigue: No  Sleep Quality: OK      PHYSICAL  "EXAM:  Objective    Ht 5' 9\" (1.753 m)   Wt 225 lb (102.1 kg)   BMI 33.23 kg/m    GENERAL: Healthy, alert and no distress  EYES: Eyes grossly normal to inspection.  No discharge or erythema, or obvious scleral/conjunctival abnormalities.  RESP: No audible wheeze, cough, or visible cyanosis.  No visible retractions or increased work of breathing.    SKIN: Visible skin clear. No significant rash, abnormal pigmentation or lesions.  NEURO: Cranial nerves grossly intact.  Mentation and speech appropriate for age.  PSYCH: Mentation appears normal, affect normal/bright, judgement and insight intact, normal speech and appearance well-groomed.        Sincerely,    Eleonora London PA-C    33 minutes spent on the date of the encounter doing chart review, review of test results, patient visit and documentation     "

## 2023-11-28 ENCOUNTER — VIRTUAL VISIT (OUTPATIENT)
Dept: SURGERY | Facility: CLINIC | Age: 58
End: 2023-11-28
Payer: COMMERCIAL

## 2023-11-28 VITALS — BODY MASS INDEX: 33.33 KG/M2 | HEIGHT: 69 IN | WEIGHT: 225 LBS

## 2023-11-28 DIAGNOSIS — E66.811 CLASS 1 OBESITY DUE TO EXCESS CALORIES WITH SERIOUS COMORBIDITY AND BODY MASS INDEX (BMI) OF 33.0 TO 33.9 IN ADULT: Primary | ICD-10-CM

## 2023-11-28 DIAGNOSIS — E66.812 CLASS 2 SEVERE OBESITY DUE TO EXCESS CALORIES WITH SERIOUS COMORBIDITY AND BODY MASS INDEX (BMI) OF 35.0 TO 35.9 IN ADULT (H): ICD-10-CM

## 2023-11-28 DIAGNOSIS — K21.9 GERD WITHOUT ESOPHAGITIS: ICD-10-CM

## 2023-11-28 DIAGNOSIS — E66.01 CLASS 2 SEVERE OBESITY DUE TO EXCESS CALORIES WITH SERIOUS COMORBIDITY AND BODY MASS INDEX (BMI) OF 35.0 TO 35.9 IN ADULT (H): ICD-10-CM

## 2023-11-28 DIAGNOSIS — E66.09 CLASS 1 OBESITY DUE TO EXCESS CALORIES WITH SERIOUS COMORBIDITY AND BODY MASS INDEX (BMI) OF 33.0 TO 33.9 IN ADULT: Primary | ICD-10-CM

## 2023-11-28 PROCEDURE — 99214 OFFICE O/P EST MOD 30 MIN: CPT | Mod: VID | Performed by: PHYSICIAN ASSISTANT

## 2023-11-28 RX ORDER — SEMAGLUTIDE 2.4 MG/.75ML
2.4 INJECTION, SOLUTION SUBCUTANEOUS WEEKLY
Qty: 9 ML | Refills: 0 | Status: SHIPPED | OUTPATIENT
Start: 2023-11-28 | End: 2024-03-27

## 2023-11-28 NOTE — PATIENT INSTRUCTIONS
"Nice to talk with you today! Thank you for allowing me the privilege of caring for you.   We hope we provided you with the excellent service you deserve.     To ensure the quality of our services you may receive a patient satisfaction survey from an independent monitoring company.  The greatest compliment you can give is \"Likely to Recommend\"      Below is our plan we discussed.-  MARV Crews      Continue wegovy 2.4 mg  Upload labs     Please call [phone] and schedule a follow up with Eleonora London PA-C in 3 months.  If you need to reach me sooner you can do so by calling [phone].    Have a great day!       Eat Better ? Move More ? Live Well    Eat 3 nutrient-rich meals each day    Don t skip meals--it will cause you to overeat later in the day!    Eating fiber (vegetables/fruits/whole grains) and protein with meals helps you stay full longer    Choose foods with less than 10 grams of sugar and 5 grams of fat per serving to prevent excess calories and weight re-gain  Eat around the same times each day to develop a routine eating schedule   Avoid snacking unless physically hungry.   Planned snacks: 1-2 times per day and no more than 150 calories    Eat protein first   Protein helps with healing, maintaining adequate muscle mass, reducing hunger and optimizing nutritional status   Aim for 60-80 grams of protein per day   Fill up on Fiber   Fiber comes from plants--fruits, veggies, whole grains, nuts/seeds and beans   Fiber is low in calories, high in phytonutrients and helps you stay full longer   Aim for 25-35 grams per day by eating fiber with meals and snacks  Eat [id number]   Take 20-30 minutes to eat each meal by taking small bites, chewing foods to applesauce consistency or 20-30 times before you swallow   Eating foods too fast can delay satiety/fullness signals and increase overeating   Slow down your eating by using toddler utensils, putting your fork/spoon down between bites and not watching TV or " emailing during meals!   Keep a Journal         Writing down what you eat, how you feel and when you are active helps you identify new changes to work on from week to week         Look for ways to cut 100 calories from your current diet 2-3 times per day  Drink 64 ounces of 0-Calorie drinks between meals   Water   Zero calorie Propel  or Vitamin Water     [employer]  Zero Calories   Crystal Light , Sugar-Free Toni-Aid , and other sugar-free lemonade or flavored christian   Keep Caffeine to less than 300mg per day ie: 3-6oz cups coffee     Work up to 45-60 minutes of physical activity most days of the week   Helps with losing weight and prevent regaining those extra pounds!    Do a combo of cardio (walking/water exercises) and strength training (lifting weights/Vinyasa yoga)    Avoid Mindless Eating   Be present when you eat--take note of the smell, taste and quality of your food   Make a list of alternative activities you could do to prevent eating out of boredom/stress  Go for a walk, call a friend, chew gum, paint your nails, re-organize the garage, etc

## 2024-03-27 DIAGNOSIS — E66.812 CLASS 2 SEVERE OBESITY DUE TO EXCESS CALORIES WITH SERIOUS COMORBIDITY AND BODY MASS INDEX (BMI) OF 35.0 TO 35.9 IN ADULT (H): ICD-10-CM

## 2024-03-27 DIAGNOSIS — E66.01 CLASS 2 SEVERE OBESITY DUE TO EXCESS CALORIES WITH SERIOUS COMORBIDITY AND BODY MASS INDEX (BMI) OF 35.0 TO 35.9 IN ADULT (H): ICD-10-CM

## 2024-03-27 RX ORDER — SEMAGLUTIDE 2.4 MG/.75ML
2.4 INJECTION, SOLUTION SUBCUTANEOUS WEEKLY
Qty: 3 ML | Refills: 0 | Status: SHIPPED | OUTPATIENT
Start: 2024-03-27

## 2024-06-05 NOTE — PROGRESS NOTES
Asked to see patient by Adalgisa ARIAS.  Patient was standing while RN was removing steri-strips and became very lightheaded, sweaty and needed to sit.  RN then came to inform author of the situation.  It was recommended patient be brought back into exam room.  Her initial vitals were WNL.  After her lightheadedness her pulse went down into the low 50's upper 40's and blood pressure was roughly 85/53 when it was 118/62 at the start of her visit. Patient did not lose consciousness.  Denied any SOB, chest pain.  Had mild nausea during this episode.  Admits that she has drank less than 10 oz total for the day.  Had some oatmeal about 2 hours ago.    From all accounts this appeared to be a vasovagal reaction.  Dr Ronald was informed and agreed.  Patient remained in exam room for 20+ minutes and made a full recovery.  She was given a small amount of apple juice and some water to drink.  She only had a few sips of both but perked up.  All symptoms subsided.  Her last BP was 127/78 and pulse was 66.  The RN tried to get patient in for IVF due to the fact she has only been getting in about 40 or so oz daily and she has to force herself to do this. Attempts were made at [facility] but was nothing was available.  Patient did not want to go to [place] but stated she would drink as much as she could today and contact clinic with any concerns.       Eleonora London MS, PALIAM     Doing well on PPI

## 2024-06-29 ENCOUNTER — HEALTH MAINTENANCE LETTER (OUTPATIENT)
Age: 59
End: 2024-06-29

## 2025-07-12 ENCOUNTER — HEALTH MAINTENANCE LETTER (OUTPATIENT)
Age: 60
End: 2025-07-12

## (undated) DEVICE — GLOVE PROTEXIS W/NEU-THERA 8.0  2D73TE80

## (undated) DEVICE — ESU GROUND PAD UNIVERSAL W/O CORD

## (undated) DEVICE — SYSTEM CLEARIFY VISUALIZATION 21-345

## (undated) DEVICE — LUBRICANT INST KIT ENDO-LUBE 220-90

## (undated) DEVICE — APPLICATOR EVICEL RIGID TIP 35CM 3908

## (undated) DEVICE — ESU HARMONIC ACE LAP SHEARS STRYKER ACE+ 7 5MMX36CM HARH36

## (undated) DEVICE — SOL WATER IRRIG 1000ML BOTTLE 2F7114

## (undated) DEVICE — SU VICRYL 0 UR-6 27" J603H

## (undated) DEVICE — GOWN IMPERVIOUS SPECIALTY XLG/XLONG 32474

## (undated) DEVICE — SUCTION CANISTER MEDIVAC LINER 3000ML W/LID 65651-530

## (undated) DEVICE — SUCTION IRR STRYKERFLOW II W/TIP 250-070-520

## (undated) DEVICE — PREP CHLORAPREP 26ML TINTED ORANGE  260815

## (undated) DEVICE — SU VICRYL 0 TIE 12X18" J906G

## (undated) DEVICE — ESU HOLDER LAP INST DISP PURPLE LONG 330MM H-PRO-330

## (undated) DEVICE — BNDG ABDOMINAL BINDER 9X62-84" 79-89210

## (undated) DEVICE — ENDO TROCAR OPTICAL ACCESS KII Z-THRD 15X100MM C0R37

## (undated) DEVICE — DRSG BANDAID 3/4X3"

## (undated) DEVICE — PACK LAP CHOLE SLC15LCFSD

## (undated) DEVICE — STPL ENDO RELOAD SIG GIA REINFORCED 60MM MED SIGTRS60AMT

## (undated) DEVICE — SOL NACL 0.9% IRRIG 1000ML BOTTLE 07138-09

## (undated) DEVICE — DRAPE BREAST/CHEST 29420

## (undated) DEVICE — SOL NACL 0.9% INJ 1000ML BAG 2B1324X

## (undated) DEVICE — STPL ENDO RELOAD SIG GIA REINFORCED 60MM X-TRA SIGTRS60AXT

## (undated) DEVICE — LINEN TOWEL PACK X5 5464

## (undated) DEVICE — ENDO TROCAR FIRST ENTRY KII FIOS Z-THRD 11X100MM CTF33

## (undated) DEVICE — SU VICRYL 4-0 PS-2 18" UND J496H

## (undated) DEVICE — SU SILK 2-0 SH 30" K833H

## (undated) RX ORDER — BUPIVACAINE HYDROCHLORIDE AND EPINEPHRINE 5; 5 MG/ML; UG/ML
INJECTION, SOLUTION EPIDURAL; INTRACAUDAL; PERINEURAL
Status: DISPENSED
Start: 2019-02-27

## (undated) RX ORDER — SCOLOPAMINE TRANSDERMAL SYSTEM 1 MG/1
PATCH, EXTENDED RELEASE TRANSDERMAL
Status: DISPENSED
Start: 2019-02-27

## (undated) RX ORDER — HEPARIN SODIUM 5000 [USP'U]/.5ML
INJECTION, SOLUTION INTRAVENOUS; SUBCUTANEOUS
Status: DISPENSED
Start: 2019-02-27

## (undated) RX ORDER — HYDROMORPHONE HYDROCHLORIDE 1 MG/ML
INJECTION, SOLUTION INTRAMUSCULAR; INTRAVENOUS; SUBCUTANEOUS
Status: DISPENSED
Start: 2019-02-27

## (undated) RX ORDER — PROPOFOL 10 MG/ML
INJECTION, EMULSION INTRAVENOUS
Status: DISPENSED
Start: 2019-02-27

## (undated) RX ORDER — FENTANYL CITRATE 50 UG/ML
INJECTION, SOLUTION INTRAMUSCULAR; INTRAVENOUS
Status: DISPENSED
Start: 2019-02-27

## (undated) RX ORDER — CEFAZOLIN SODIUM IN 0.9 % NACL 3 G/100 ML
INTRAVENOUS SOLUTION, PIGGYBACK (ML) INTRAVENOUS
Status: DISPENSED
Start: 2019-02-27

## (undated) RX ORDER — VECURONIUM BROMIDE 1 MG/ML
INJECTION, POWDER, LYOPHILIZED, FOR SOLUTION INTRAVENOUS
Status: DISPENSED
Start: 2019-02-27

## (undated) RX ORDER — MEPERIDINE HYDROCHLORIDE 25 MG/ML
INJECTION INTRAMUSCULAR; INTRAVENOUS; SUBCUTANEOUS
Status: DISPENSED
Start: 2019-02-27